# Patient Record
Sex: MALE | Race: WHITE | NOT HISPANIC OR LATINO | Employment: OTHER | URBAN - METROPOLITAN AREA
[De-identification: names, ages, dates, MRNs, and addresses within clinical notes are randomized per-mention and may not be internally consistent; named-entity substitution may affect disease eponyms.]

---

## 2022-01-01 ENCOUNTER — TELEPHONE (OUTPATIENT)
Dept: ELECTROPHYSIOLOGY | Facility: CLINIC | Age: 87
End: 2022-01-01
Payer: MEDICARE

## 2022-01-01 ENCOUNTER — CLINICAL SUPPORT (OUTPATIENT)
Dept: CARDIOLOGY | Facility: HOSPITAL | Age: 87
End: 2022-01-01
Attending: STUDENT IN AN ORGANIZED HEALTH CARE EDUCATION/TRAINING PROGRAM
Payer: MEDICARE

## 2022-01-01 ENCOUNTER — CLINICAL SUPPORT (OUTPATIENT)
Dept: CARDIOLOGY | Facility: HOSPITAL | Age: 87
End: 2022-01-01
Payer: MEDICARE

## 2022-01-01 ENCOUNTER — PATIENT MESSAGE (OUTPATIENT)
Dept: ELECTROPHYSIOLOGY | Facility: CLINIC | Age: 87
End: 2022-01-01
Payer: MEDICARE

## 2022-01-01 ENCOUNTER — OFFICE VISIT (OUTPATIENT)
Dept: ELECTROPHYSIOLOGY | Facility: CLINIC | Age: 87
End: 2022-01-01
Attending: STUDENT IN AN ORGANIZED HEALTH CARE EDUCATION/TRAINING PROGRAM
Payer: MEDICARE

## 2022-01-01 ENCOUNTER — PATIENT MESSAGE (OUTPATIENT)
Dept: CARDIOLOGY | Facility: CLINIC | Age: 87
End: 2022-01-01
Payer: MEDICARE

## 2022-01-01 VITALS
SYSTOLIC BLOOD PRESSURE: 132 MMHG | BODY MASS INDEX: 23.9 KG/M2 | HEART RATE: 73 BPM | WEIGHT: 140 LBS | DIASTOLIC BLOOD PRESSURE: 64 MMHG | HEIGHT: 64 IN

## 2022-01-01 DIAGNOSIS — I25.10 ARTERIOSCLEROSIS OF CORONARY ARTERY: ICD-10-CM

## 2022-01-01 DIAGNOSIS — I71.40 ABDOMINAL AORTIC ANEURYSM (AAA) WITHOUT RUPTURE, UNSPECIFIED PART: ICD-10-CM

## 2022-01-01 DIAGNOSIS — S72.001D HIP FRACTURE REQUIRING OPERATIVE REPAIR, RIGHT, CLOSED, WITH ROUTINE HEALING, SUBSEQUENT ENCOUNTER: ICD-10-CM

## 2022-01-01 DIAGNOSIS — Z95.0 PRESENCE OF CARDIAC PACEMAKER: ICD-10-CM

## 2022-01-01 DIAGNOSIS — Z95.5 STATUS POST CORONARY ARTERY STENT PLACEMENT: ICD-10-CM

## 2022-01-01 DIAGNOSIS — R00.1 SYMPTOMATIC BRADYCARDIA: ICD-10-CM

## 2022-01-01 DIAGNOSIS — I25.10 CORONARY ARTERY DISEASE INVOLVING NATIVE CORONARY ARTERY OF NATIVE HEART WITHOUT ANGINA PECTORIS: ICD-10-CM

## 2022-01-01 DIAGNOSIS — E78.2 MIXED HYPERLIPIDEMIA: ICD-10-CM

## 2022-01-01 DIAGNOSIS — C61 CARCINOMA OF PROSTATE: ICD-10-CM

## 2022-01-01 DIAGNOSIS — Z95.0 PACEMAKER: ICD-10-CM

## 2022-01-01 DIAGNOSIS — I44.2 COMPLETE HEART BLOCK: Primary | ICD-10-CM

## 2022-01-01 DIAGNOSIS — I50.32 CHRONIC DIASTOLIC CONGESTIVE HEART FAILURE: ICD-10-CM

## 2022-01-01 DIAGNOSIS — I45.9 HEART BLOCK: ICD-10-CM

## 2022-01-01 DIAGNOSIS — I44.2 COMPLETE HEART BLOCK: ICD-10-CM

## 2022-01-01 DIAGNOSIS — J44.9 CHRONIC OBSTRUCTIVE PULMONARY DISEASE, UNSPECIFIED COPD TYPE: ICD-10-CM

## 2022-01-01 DIAGNOSIS — M40.209 KYPHOSIS, UNSPECIFIED KYPHOSIS TYPE, UNSPECIFIED SPINAL REGION: ICD-10-CM

## 2022-01-01 DIAGNOSIS — Z95.0 STATUS POST PLACEMENT OF LEADLESS CARDIAC PACEMAKER: ICD-10-CM

## 2022-01-01 DIAGNOSIS — K21.9 GASTROESOPHAGEAL REFLUX DISEASE, UNSPECIFIED WHETHER ESOPHAGITIS PRESENT: ICD-10-CM

## 2022-01-01 PROCEDURE — 3288F PR FALLS RISK ASSESSMENT DOCUMENTED: ICD-10-PCS | Mod: CPTII,S$GLB,, | Performed by: STUDENT IN AN ORGANIZED HEALTH CARE EDUCATION/TRAINING PROGRAM

## 2022-01-01 PROCEDURE — 1100F PR PT FALLS ASSESS DOC 2+ FALLS/FALL W/INJURY/YR: ICD-10-PCS | Mod: CPTII,S$GLB,, | Performed by: STUDENT IN AN ORGANIZED HEALTH CARE EDUCATION/TRAINING PROGRAM

## 2022-01-01 PROCEDURE — 99999 PR PBB SHADOW E&M-EST. PATIENT-LVL III: CPT | Mod: PBBFAC,,, | Performed by: STUDENT IN AN ORGANIZED HEALTH CARE EDUCATION/TRAINING PROGRAM

## 2022-01-01 PROCEDURE — 1100F PTFALLS ASSESS-DOCD GE2>/YR: CPT | Mod: CPTII,S$GLB,, | Performed by: STUDENT IN AN ORGANIZED HEALTH CARE EDUCATION/TRAINING PROGRAM

## 2022-01-01 PROCEDURE — 93296 REM INTERROG EVL PM/IDS: CPT | Performed by: STUDENT IN AN ORGANIZED HEALTH CARE EDUCATION/TRAINING PROGRAM

## 2022-01-01 PROCEDURE — 99214 OFFICE O/P EST MOD 30 MIN: CPT | Mod: S$GLB,,, | Performed by: STUDENT IN AN ORGANIZED HEALTH CARE EDUCATION/TRAINING PROGRAM

## 2022-01-01 PROCEDURE — 93279 PRGRMG DEV EVAL PM/LDLS PM: CPT

## 2022-01-01 PROCEDURE — 1126F AMNT PAIN NOTED NONE PRSNT: CPT | Mod: CPTII,S$GLB,, | Performed by: STUDENT IN AN ORGANIZED HEALTH CARE EDUCATION/TRAINING PROGRAM

## 2022-01-01 PROCEDURE — 1159F MED LIST DOCD IN RCRD: CPT | Mod: CPTII,S$GLB,, | Performed by: STUDENT IN AN ORGANIZED HEALTH CARE EDUCATION/TRAINING PROGRAM

## 2022-01-01 PROCEDURE — 1159F PR MEDICATION LIST DOCUMENTED IN MEDICAL RECORD: ICD-10-PCS | Mod: CPTII,S$GLB,, | Performed by: STUDENT IN AN ORGANIZED HEALTH CARE EDUCATION/TRAINING PROGRAM

## 2022-01-01 PROCEDURE — 1126F PR PAIN SEVERITY QUANTIFIED, NO PAIN PRESENT: ICD-10-PCS | Mod: CPTII,S$GLB,, | Performed by: STUDENT IN AN ORGANIZED HEALTH CARE EDUCATION/TRAINING PROGRAM

## 2022-01-01 PROCEDURE — 99214 PR OFFICE/OUTPT VISIT, EST, LEVL IV, 30-39 MIN: ICD-10-PCS | Mod: S$GLB,,, | Performed by: STUDENT IN AN ORGANIZED HEALTH CARE EDUCATION/TRAINING PROGRAM

## 2022-01-01 PROCEDURE — 3288F FALL RISK ASSESSMENT DOCD: CPT | Mod: CPTII,S$GLB,, | Performed by: STUDENT IN AN ORGANIZED HEALTH CARE EDUCATION/TRAINING PROGRAM

## 2022-01-01 PROCEDURE — 99999 PR PBB SHADOW E&M-EST. PATIENT-LVL III: ICD-10-PCS | Mod: PBBFAC,,, | Performed by: STUDENT IN AN ORGANIZED HEALTH CARE EDUCATION/TRAINING PROGRAM

## 2022-02-22 ENCOUNTER — PATIENT MESSAGE (OUTPATIENT)
Dept: CARDIOLOGY | Facility: CLINIC | Age: 87
End: 2022-02-22
Payer: MEDICARE

## 2022-02-28 ENCOUNTER — HOSPITAL ENCOUNTER (OUTPATIENT)
Dept: CARDIOLOGY | Facility: HOSPITAL | Age: 87
Discharge: HOME | End: 2022-02-28
Attending: INTERNAL MEDICINE
Payer: MEDICARE

## 2022-02-28 ENCOUNTER — OFFICE VISIT (OUTPATIENT)
Dept: CARDIOLOGY | Facility: CLINIC | Age: 87
End: 2022-02-28
Payer: MEDICARE

## 2022-02-28 VITALS
HEIGHT: 65 IN | DIASTOLIC BLOOD PRESSURE: 60 MMHG | SYSTOLIC BLOOD PRESSURE: 140 MMHG | HEART RATE: 40 BPM | BODY MASS INDEX: 22.99 KG/M2 | WEIGHT: 138 LBS

## 2022-02-28 VITALS
HEIGHT: 65 IN | HEART RATE: 40 BPM | SYSTOLIC BLOOD PRESSURE: 141 MMHG | BODY MASS INDEX: 23.03 KG/M2 | WEIGHT: 138.25 LBS | DIASTOLIC BLOOD PRESSURE: 57 MMHG

## 2022-02-28 DIAGNOSIS — I44.2 COMPLETE HEART BLOCK: Primary | ICD-10-CM

## 2022-02-28 DIAGNOSIS — E78.5 HYPERLIPIDEMIA, UNSPECIFIED HYPERLIPIDEMIA TYPE: ICD-10-CM

## 2022-02-28 DIAGNOSIS — I50.31 ACUTE DIASTOLIC CONGESTIVE HEART FAILURE: ICD-10-CM

## 2022-02-28 DIAGNOSIS — I25.10 ARTERIOSCLEROSIS OF CORONARY ARTERY: ICD-10-CM

## 2022-02-28 DIAGNOSIS — J44.9 CHRONIC OBSTRUCTIVE PULMONARY DISEASE, UNSPECIFIED COPD TYPE: ICD-10-CM

## 2022-02-28 PROBLEM — H35.30 MACULAR DEGENERATION: Status: ACTIVE | Noted: 2022-02-28

## 2022-02-28 PROBLEM — K21.9 GASTROESOPHAGEAL REFLUX DISEASE: Status: ACTIVE | Noted: 2022-02-28

## 2022-02-28 PROBLEM — M40.209 KYPHOSIS: Status: ACTIVE | Noted: 2022-02-16

## 2022-02-28 PROBLEM — R42 VERTIGO: Status: ACTIVE | Noted: 2022-02-16

## 2022-02-28 PROBLEM — C61 CARCINOMA OF PROSTATE: Status: ACTIVE | Noted: 2022-02-28

## 2022-02-28 LAB
ASCENDING AORTA: 4.67 CM
AV INDEX (PROSTH): 0.44
AV MEAN GRADIENT: 9 MMHG
AV PEAK GRADIENT: 17 MMHG
AV VALVE AREA: 2 CM2
AV VELOCITY RATIO: 0.39
BSA FOR ECHO PROCEDURE: 1.69 M2
CV ECHO LV RWT: 0.33 CM
DOP CALC AO PEAK VEL: 2.07 M/S
DOP CALC AO VTI: 47.56 CM
DOP CALC LVOT AREA: 4.6 CM2
DOP CALC LVOT DIAMETER: 2.42 CM
DOP CALC LVOT PEAK VEL: 0.8 M/S
DOP CALC LVOT STROKE VOLUME: 95.12 CM3
DOP CALCLVOT PEAK VEL VTI: 20.69 CM
E WAVE DECELERATION TIME: 231.81 MSEC
E/A RATIO: 0.51
E/E' RATIO: 11 M/S
ECHO LV POSTERIOR WALL: 0.82 CM (ref 0.6–1.1)
EJECTION FRACTION: 50 %
FRACTIONAL SHORTENING: 34 % (ref 28–44)
INTERVENTRICULAR SEPTUM: 0.85 CM (ref 0.6–1.1)
IVRT: 282.59 MSEC
LA MAJOR: 5.55 CM
LA MINOR: 5.07 CM
LA WIDTH: 4.77 CM
LEFT ATRIUM SIZE: 3.84 CM
LEFT ATRIUM VOLUME INDEX MOD: 46.4 ML/M2
LEFT ATRIUM VOLUME INDEX: 48.8 ML/M2
LEFT ATRIUM VOLUME MOD: 78.45 CM3
LEFT ATRIUM VOLUME: 82.5 CM3
LEFT INTERNAL DIMENSION IN SYSTOLE: 3.25 CM (ref 2.1–4)
LEFT VENTRICLE DIASTOLIC VOLUME INDEX: 67.48 ML/M2
LEFT VENTRICLE DIASTOLIC VOLUME: 114.04 ML
LEFT VENTRICLE MASS INDEX: 83 G/M2
LEFT VENTRICLE SYSTOLIC VOLUME INDEX: 25.1 ML/M2
LEFT VENTRICLE SYSTOLIC VOLUME: 42.4 ML
LEFT VENTRICULAR INTERNAL DIMENSION IN DIASTOLE: 4.92 CM (ref 3.5–6)
LEFT VENTRICULAR MASS: 139.62 G
LV LATERAL E/E' RATIO: 9.17 M/S
LV SEPTAL E/E' RATIO: 13.75 M/S
MV PEAK A VEL: 1.08 M/S
MV PEAK E VEL: 0.55 M/S
MV STENOSIS PRESSURE HALF TIME: 67.23 MS
MV VALVE AREA P 1/2 METHOD: 3.27 CM2
PISA TR MAX VEL: 4.25 M/S
PULM VEIN S/D RATIO: 1.31
PV PEAK D VEL: 0.35 M/S
PV PEAK S VEL: 0.46 M/S
RA MAJOR: 6.11 CM
RA PRESSURE: 3 MMHG
RA WIDTH: 3.23 CM
RIGHT VENTRICULAR END-DIASTOLIC DIMENSION: 3.32 CM
SINUS: 3.99 CM
STJ: 3.61 CM
TDI LATERAL: 0.06 M/S
TDI SEPTAL: 0.04 M/S
TDI: 0.05 M/S
TR MAX PG: 72 MMHG
TRICUSPID ANNULAR PLANE SYSTOLIC EXCURSION: 2.16 CM
TV REST PULMONARY ARTERY PRESSURE: 75 MMHG

## 2022-02-28 PROCEDURE — 99214 OFFICE O/P EST MOD 30 MIN: CPT | Mod: 25,S$GLB,, | Performed by: INTERNAL MEDICINE

## 2022-02-28 PROCEDURE — 93306 TTE W/DOPPLER COMPLETE: CPT

## 2022-02-28 PROCEDURE — 1101F PT FALLS ASSESS-DOCD LE1/YR: CPT | Mod: CPTII,S$GLB,, | Performed by: INTERNAL MEDICINE

## 2022-02-28 PROCEDURE — 1126F PR PAIN SEVERITY QUANTIFIED, NO PAIN PRESENT: ICD-10-PCS | Mod: CPTII,S$GLB,, | Performed by: INTERNAL MEDICINE

## 2022-02-28 PROCEDURE — 93000 EKG 12-LEAD: ICD-10-PCS | Mod: S$GLB,,, | Performed by: INTERNAL MEDICINE

## 2022-02-28 PROCEDURE — 93000 ELECTROCARDIOGRAM COMPLETE: CPT | Mod: S$GLB,,, | Performed by: INTERNAL MEDICINE

## 2022-02-28 PROCEDURE — 93306 ECHO (CUPID ONLY): ICD-10-PCS | Mod: 26,,, | Performed by: INTERNAL MEDICINE

## 2022-02-28 PROCEDURE — 99999 PR PBB SHADOW E&M-EST. PATIENT-LVL IV: CPT | Mod: PBBFAC,,, | Performed by: INTERNAL MEDICINE

## 2022-02-28 PROCEDURE — 1126F AMNT PAIN NOTED NONE PRSNT: CPT | Mod: CPTII,S$GLB,, | Performed by: INTERNAL MEDICINE

## 2022-02-28 PROCEDURE — 1159F PR MEDICATION LIST DOCUMENTED IN MEDICAL RECORD: ICD-10-PCS | Mod: CPTII,S$GLB,, | Performed by: INTERNAL MEDICINE

## 2022-02-28 PROCEDURE — 1160F PR REVIEW ALL MEDS BY PRESCRIBER/CLIN PHARMACIST DOCUMENTED: ICD-10-PCS | Mod: CPTII,S$GLB,, | Performed by: INTERNAL MEDICINE

## 2022-02-28 PROCEDURE — 3288F FALL RISK ASSESSMENT DOCD: CPT | Mod: CPTII,S$GLB,, | Performed by: INTERNAL MEDICINE

## 2022-02-28 PROCEDURE — 1101F PR PT FALLS ASSESS DOC 0-1 FALLS W/OUT INJ PAST YR: ICD-10-PCS | Mod: CPTII,S$GLB,, | Performed by: INTERNAL MEDICINE

## 2022-02-28 PROCEDURE — 99999 PR PBB SHADOW E&M-EST. PATIENT-LVL IV: ICD-10-PCS | Mod: PBBFAC,,, | Performed by: INTERNAL MEDICINE

## 2022-02-28 PROCEDURE — 99214 PR OFFICE/OUTPT VISIT, EST, LEVL IV, 30-39 MIN: ICD-10-PCS | Mod: 25,S$GLB,, | Performed by: INTERNAL MEDICINE

## 2022-02-28 PROCEDURE — 3288F PR FALLS RISK ASSESSMENT DOCUMENTED: ICD-10-PCS | Mod: CPTII,S$GLB,, | Performed by: INTERNAL MEDICINE

## 2022-02-28 PROCEDURE — 93306 TTE W/DOPPLER COMPLETE: CPT | Mod: 26,,, | Performed by: INTERNAL MEDICINE

## 2022-02-28 PROCEDURE — 1159F MED LIST DOCD IN RCRD: CPT | Mod: CPTII,S$GLB,, | Performed by: INTERNAL MEDICINE

## 2022-02-28 PROCEDURE — 1160F RVW MEDS BY RX/DR IN RCRD: CPT | Mod: CPTII,S$GLB,, | Performed by: INTERNAL MEDICINE

## 2022-02-28 RX ORDER — FINASTERIDE 5 MG/1
5 TABLET, FILM COATED ORAL DAILY
COMMUNITY
Start: 2020-01-01

## 2022-02-28 RX ORDER — FERROUS GLUCONATE 324(38)MG
324 TABLET ORAL
COMMUNITY

## 2022-02-28 RX ORDER — LEVOCETIRIZINE DIHYDROCHLORIDE 5 MG/1
TABLET, FILM COATED ORAL DAILY
COMMUNITY
End: 2022-01-01

## 2022-02-28 RX ORDER — DORZOLAMIDE HCL 20 MG/ML
1 SOLUTION/ DROPS OPHTHALMIC DAILY
COMMUNITY
Start: 2021-12-21

## 2022-02-28 RX ORDER — BIMATOPROST 0.1 MG/ML
1 SOLUTION/ DROPS OPHTHALMIC NIGHTLY
COMMUNITY
Start: 2021-12-21

## 2022-02-28 RX ORDER — BRIMONIDINE TARTRATE 2 MG/ML
1 SOLUTION/ DROPS OPHTHALMIC 2 TIMES DAILY
COMMUNITY
End: 2023-01-01

## 2022-02-28 RX ORDER — TAMSULOSIN HYDROCHLORIDE 0.4 MG/1
0.4 CAPSULE ORAL DAILY
COMMUNITY
Start: 2020-01-01

## 2022-02-28 RX ORDER — FUROSEMIDE 40 MG/1
40 TABLET ORAL
COMMUNITY
Start: 2022-02-22 | End: 2022-06-01 | Stop reason: SDUPTHER

## 2022-02-28 RX ORDER — ACETAMINOPHEN, DIPHENHYDRAMINE HCL, PHENYLEPHRINE HCL 325; 25; 5 MG/1; MG/1; MG/1
1200 TABLET ORAL DAILY
COMMUNITY

## 2022-02-28 RX ORDER — MULTIVITAMIN
TABLET ORAL DAILY
COMMUNITY

## 2022-02-28 RX ORDER — ATORVASTATIN CALCIUM 80 MG/1
TABLET, FILM COATED ORAL
COMMUNITY
Start: 2020-01-01 | End: 2022-03-03 | Stop reason: SDUPTHER

## 2022-03-02 DIAGNOSIS — I45.9 HEART BLOCK: Primary | ICD-10-CM

## 2022-03-03 ENCOUNTER — OFFICE VISIT (OUTPATIENT)
Dept: ELECTROPHYSIOLOGY | Facility: CLINIC | Age: 87
End: 2022-03-03
Payer: MEDICARE

## 2022-03-03 ENCOUNTER — PATIENT MESSAGE (OUTPATIENT)
Dept: CARDIOLOGY | Facility: CLINIC | Age: 87
End: 2022-03-03
Payer: MEDICARE

## 2022-03-03 ENCOUNTER — HOSPITAL ENCOUNTER (OUTPATIENT)
Dept: CARDIOLOGY | Facility: CLINIC | Age: 87
Discharge: HOME | End: 2022-03-03
Payer: MEDICARE

## 2022-03-03 VITALS
SYSTOLIC BLOOD PRESSURE: 118 MMHG | BODY MASS INDEX: 22.99 KG/M2 | WEIGHT: 138 LBS | DIASTOLIC BLOOD PRESSURE: 68 MMHG | HEIGHT: 65 IN | HEART RATE: 63 BPM

## 2022-03-03 DIAGNOSIS — I45.9 HEART BLOCK: ICD-10-CM

## 2022-03-03 DIAGNOSIS — I25.10 CORONARY ARTERY DISEASE INVOLVING NATIVE CORONARY ARTERY OF NATIVE HEART WITHOUT ANGINA PECTORIS: ICD-10-CM

## 2022-03-03 DIAGNOSIS — R00.1 SYMPTOMATIC BRADYCARDIA: ICD-10-CM

## 2022-03-03 DIAGNOSIS — M40.209 KYPHOSIS, UNSPECIFIED KYPHOSIS TYPE, UNSPECIFIED SPINAL REGION: ICD-10-CM

## 2022-03-03 DIAGNOSIS — J44.9 CHRONIC OBSTRUCTIVE PULMONARY DISEASE, UNSPECIFIED COPD TYPE: ICD-10-CM

## 2022-03-03 DIAGNOSIS — I44.2 COMPLETE HEART BLOCK: Primary | ICD-10-CM

## 2022-03-03 PROCEDURE — 93010 RHYTHM STRIP: ICD-10-PCS | Mod: S$GLB,,, | Performed by: INTERNAL MEDICINE

## 2022-03-03 PROCEDURE — 99215 OFFICE O/P EST HI 40 MIN: CPT | Mod: S$GLB,,, | Performed by: STUDENT IN AN ORGANIZED HEALTH CARE EDUCATION/TRAINING PROGRAM

## 2022-03-03 PROCEDURE — 93005 RHYTHM STRIP: ICD-10-PCS | Mod: S$GLB,,, | Performed by: STUDENT IN AN ORGANIZED HEALTH CARE EDUCATION/TRAINING PROGRAM

## 2022-03-03 PROCEDURE — 1159F MED LIST DOCD IN RCRD: CPT | Mod: CPTII,S$GLB,, | Performed by: STUDENT IN AN ORGANIZED HEALTH CARE EDUCATION/TRAINING PROGRAM

## 2022-03-03 PROCEDURE — 99215 PR OFFICE/OUTPT VISIT, EST, LEVL V, 40-54 MIN: ICD-10-PCS | Mod: S$GLB,,, | Performed by: STUDENT IN AN ORGANIZED HEALTH CARE EDUCATION/TRAINING PROGRAM

## 2022-03-03 PROCEDURE — 99999 PR PBB SHADOW E&M-EST. PATIENT-LVL IV: ICD-10-PCS | Mod: PBBFAC,,, | Performed by: STUDENT IN AN ORGANIZED HEALTH CARE EDUCATION/TRAINING PROGRAM

## 2022-03-03 PROCEDURE — 93010 ELECTROCARDIOGRAM REPORT: CPT | Mod: S$GLB,,, | Performed by: INTERNAL MEDICINE

## 2022-03-03 PROCEDURE — 3288F PR FALLS RISK ASSESSMENT DOCUMENTED: ICD-10-PCS | Mod: CPTII,S$GLB,, | Performed by: STUDENT IN AN ORGANIZED HEALTH CARE EDUCATION/TRAINING PROGRAM

## 2022-03-03 PROCEDURE — 1101F PT FALLS ASSESS-DOCD LE1/YR: CPT | Mod: CPTII,S$GLB,, | Performed by: STUDENT IN AN ORGANIZED HEALTH CARE EDUCATION/TRAINING PROGRAM

## 2022-03-03 PROCEDURE — 1159F PR MEDICATION LIST DOCUMENTED IN MEDICAL RECORD: ICD-10-PCS | Mod: CPTII,S$GLB,, | Performed by: STUDENT IN AN ORGANIZED HEALTH CARE EDUCATION/TRAINING PROGRAM

## 2022-03-03 PROCEDURE — 3288F FALL RISK ASSESSMENT DOCD: CPT | Mod: CPTII,S$GLB,, | Performed by: STUDENT IN AN ORGANIZED HEALTH CARE EDUCATION/TRAINING PROGRAM

## 2022-03-03 PROCEDURE — 93005 ELECTROCARDIOGRAM TRACING: CPT | Mod: S$GLB,,, | Performed by: STUDENT IN AN ORGANIZED HEALTH CARE EDUCATION/TRAINING PROGRAM

## 2022-03-03 PROCEDURE — 1126F PR PAIN SEVERITY QUANTIFIED, NO PAIN PRESENT: ICD-10-PCS | Mod: CPTII,S$GLB,, | Performed by: STUDENT IN AN ORGANIZED HEALTH CARE EDUCATION/TRAINING PROGRAM

## 2022-03-03 PROCEDURE — 1126F AMNT PAIN NOTED NONE PRSNT: CPT | Mod: CPTII,S$GLB,, | Performed by: STUDENT IN AN ORGANIZED HEALTH CARE EDUCATION/TRAINING PROGRAM

## 2022-03-03 PROCEDURE — 99999 PR PBB SHADOW E&M-EST. PATIENT-LVL IV: CPT | Mod: PBBFAC,,, | Performed by: STUDENT IN AN ORGANIZED HEALTH CARE EDUCATION/TRAINING PROGRAM

## 2022-03-03 PROCEDURE — 1101F PR PT FALLS ASSESS DOC 0-1 FALLS W/OUT INJ PAST YR: ICD-10-PCS | Mod: CPTII,S$GLB,, | Performed by: STUDENT IN AN ORGANIZED HEALTH CARE EDUCATION/TRAINING PROGRAM

## 2022-03-03 RX ORDER — ATORVASTATIN CALCIUM 80 MG/1
80 TABLET, FILM COATED ORAL NIGHTLY
Qty: 90 TABLET | Refills: 3 | Status: SHIPPED | OUTPATIENT
Start: 2022-03-03 | End: 2023-01-01

## 2022-03-04 NOTE — PROGRESS NOTES
Electrophysiology Clinic Note    Reason for new patient visit: Evaluation and recommendations regarding symptomatic bradycardia with periods of complete heart block.     PRESENTING HISTORY:     History of Present Illness:  Mr. Jesus Matos Sr. is a rodo 89-year-old gentleman who presents today for evaluation and recommendations regarding symptomatic bradycardia with periods of complete heart block. He has a past medical history significant for symptomatic bradycardia with periods of complete heart block, coronary artery disease, COPD, and kyphosis.    He follows in general cardiology with Dr. Soliz and receives most of his care from [facility]. He was recently seen by Dr. Soliz on 2/28/2022 with complaints of slightly worsened shortness of breath, generalized fatigue, and transient episodes of lightheadedness. He was noted on an ECG at that time to have complete heart block and was referred for pacemaker implantation.     Mr. Matos presents today with his daughter. In discussion with Mr. Matos today, he tells me that he is feeling overall well, but continues to endorse fatigue and periods of lightheadedness. He denies any episodes of syncope/presyncope, chest pain or chest discomfort, palpitations, nausea or vomiting, orthopnea, or PND. He reports that he previously experienced mild lower extremity edema, but remains stable on his current diuretic dose with no further episodes of edema. He reports baseline mild shortness of breath and dyspnea with exertion, and feels that this is slightly worse than his baseline COPD. He ambulates with a walker or in his wheelchair for farther distances. He cannot climb a flight of stairs secondary to osteoarthritis and kyphosis.     Review of Systems:  Review of Systems   Constitutional: Positive for fatigue. Negative for activity change.        Exercise intolerance and generalized fatigue.   HENT: Positive for hearing loss. Negative for nasal  congestion, nosebleeds, postnasal drip, rhinorrhea, sinus pressure/congestion, sneezing and sore throat.    Respiratory: Positive for shortness of breath. Negative for apnea, cough, chest tightness and wheezing.    Cardiovascular: Positive for leg swelling. Negative for chest pain and palpitations.   Gastrointestinal: Negative for abdominal distention, abdominal pain, blood in stool, change in bowel habit, constipation, diarrhea, nausea, vomiting and change in bowel habit.   Genitourinary: Negative for dysuria and hematuria.   Musculoskeletal: Positive for arthralgias, back pain and gait problem.   Neurological: Positive for dizziness and light-headedness. Negative for seizures, syncope, weakness, headaches, disturbances in coordination and coordination difficulties.        PAST HISTORY:     Past Medical History:  - Symptomatic bradycardia   - Periods of complete heart block  - Coronary artery disease  - COPD  - Kyphosis    Family History:  He denies any relevant cardiac history.    Social History:  He  reports that he has never smoked. He has never used smokeless tobacco. He reports previous alcohol use. No history on file for drug use.       MEDICATIONS & ALLERGIES:     Review of patient's allergies indicates:   Allergen Reactions    Opioids - morphine analogues Anaphylaxis     Other reaction(s): Low blood pressure, SHOCK, UNSPECIFIED    Aspirin     Nsaids (non-steroidal anti-inflammatory drug)      Other reaction(s): Other (See Comments)    Tramadol Hallucinations    Penicillins Nausea Only     Other reaction(s): nausa       Current Outpatient Medications on File Prior to Visit   Medication Sig Dispense Refill    bimatoprost (LUMIGAN) 0.01 % Drop       brimonidine 0.2% (ALPHAGAN) 0.2 % Drop 1 drop.      calcitonin,salmon,synthetic (CALCITONIN, SALMON, NASL)       calcium carbonate/vitamin D3 (CALCIUM 500 + D, D3, ORAL) Take by mouth Daily.      clopidogreL (PLAVIX) 75 mg tablet TAKE 1 TABLET EVERY DAY  "90 tablet 3    cyanocobalamin, vitamin B-12, 5,000 mcg Subl Place 1,200 mcg under the tongue Daily.      dorzolamide (TRUSOPT) 2 % ophthalmic solution Place 1 drop into both eyes Daily.      ferrous gluconate (FERGON) 324 MG tablet Take 324 mg by mouth daily with breakfast.      finasteride (PROSCAR) 5 mg tablet Take 5 mg by mouth once daily.      furosemide (LASIX) 40 MG tablet Take 40 mg by mouth.      levocetirizine (XYZAL) 5 MG tablet Take 5 mg by mouth every evening.      multivitamin (THERAGRAN) per tablet Take 1 tablet by mouth once daily.      tamsulosin (FLOMAX) 0.4 mg Cap Take 0.4 mg by mouth once daily.      vit C/E/Zn/coppr/lutein/zeaxan (PRESERVISION AREDS-2 ORAL) Take by mouth Daily.       No current facility-administered medications on file prior to visit.        OBJECTIVE:     Vital Signs:  /68   Pulse 63   Ht 5' 5" (1.651 m)   Wt 62.6 kg (138 lb 0.1 oz)   BMI 22.97 kg/m²     Physical Exam:  Physical Exam  Constitutional:       General: He is not in acute distress.     Appearance: Normal appearance. He is not ill-appearing or diaphoretic.      Comments: Well-appearing elderly man in NAD presenting in a wheelchair.   HENT:      Head: Normocephalic and atraumatic.      Comments: Mask worn in clinic in the setting of COVID precautions.   Eyes:      Pupils: Pupils are equal, round, and reactive to light.   Cardiovascular:      Rate and Rhythm: Normal rate and regular rhythm.      Pulses: Normal pulses.      Heart sounds: Normal heart sounds. No murmur heard.    No friction rub. No gallop.   Pulmonary:      Effort: Pulmonary effort is normal. No respiratory distress.      Breath sounds: Normal breath sounds. No wheezing, rhonchi or rales.   Chest:      Chest wall: No tenderness.   Abdominal:      General: There is no distension.      Palpations: Abdomen is soft.      Tenderness: There is no abdominal tenderness.   Musculoskeletal:         General: No swelling or tenderness.      " Cervical back: Normal range of motion.      Right lower leg: No edema.      Left lower leg: No edema.   Skin:     General: Skin is warm and dry.      Findings: No erythema, lesion or rash.   Neurological:      General: No focal deficit present.      Mental Status: He is alert and oriented to person, place, and time. Mental status is at baseline.      Motor: No weakness.      Gait: Gait normal.   Psychiatric:         Mood and Affect: Mood normal.         Behavior: Behavior normal.        Laboratory Data:  Lab Results   Component Value Date    WBC 6.02 02/09/2010    HGB 12.6 (L) 02/09/2010    HCT 38.0 (L) 02/09/2010    MCV 89.8 02/09/2010     02/09/2010     Lab Results   Component Value Date    GLU 99 02/09/2010     02/09/2010    K 4.6 02/09/2010     02/09/2010    CO2 30 (H) 02/09/2010    BUN 23 02/09/2010    CREATININE 0.9 02/09/2010    CALCIUM 8.7 02/09/2010     Lab Results   Component Value Date    INR 1.0 02/09/2010       Pertinent Cardiac Data:  ECG: Normal sinus rhythm with first degree AV block, rate of 63 bpm,  ms, RBBB and LAFB with  ms, QT/QTc 496/507 ms, isolated PVC.     Resting 2D Transthoracic Echocardiogram - 2/28/2022:  · The left ventricle is normal in size with low normal systolic function.  · The estimated ejection fraction is 50%.  · Grade I left ventricular diastolic dysfunction.  · Moderate left atrial enlargement.  · The estimated PA systolic pressure is 75 mmHg.  · Normal right ventricular size with normal right ventricular systolic function.  · There is moderate pulmonary hypertension.  · Normal central venous pressure (3 mmHg).  · Moderate right atrial enlargement.  · Mild mitral regurgitation.  · Mild tricuspid regurgitation.  · Mild aortic regurgitation.      ASSESSMENT & PLAN:   Mr. Jesus Matos Sr. is a rodo 89-year-old gentleman who presents today for evaluation and recommendations regarding symptomatic bradycardia with periods of complete heart  block. He has a past medical history significant for symptomatic bradycardia with periods of complete heart block, coronary artery disease, COPD, and kyphosis.    - We discussed the process of age-related conduction system fibrosis and the concept of sinus node dysfunction. We reviewed his prior ECG revealing complete heart block and his current ECG revealing trifascicular block. We discussed that a permanent pacemaker is required in order to ensure his heart rates remain within normal ranges and to ensure AV synchrony. He is now endorsing symptoms of dizziness, lightheadedness, exercise intolerance, and periods of presyncope associated with his periods of complete heart block.   - We reviewed his recent resting echocardiogram demonstrating preserved systolic function, with LVEF 50%. He is right-hand dominant, with RBBB QRSd of 148 ms. 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   - His daughter mentions that he may have tortuous subclavian vasculature, and that transvenous placement may be difficult. We will plan to obtain a venogram prior to pocket creation for assessment. If transvenous placement proves prohibitive, we discussed the concept of implantation of a Micra AV leadless pacemaker from the groin.    - The procedure itself was discussed, along with the indications for implantation of a pacemaker, including potential risks, benefits, and alternative options. We reviewed what to expect at the time of implant in addition to the need for continued clinic observation following implantation.   - All questions and concerns were addressed, and Mr. Matos and his daughter agree to proceed with implantation of a pacemaker. Informed consent was obtained in clinic today.     This patient will present to the EP laboratory at my next available procedure date to undergo pacemaker implantation. All questions and concerns were  addressed at this encounter.     Signing Physician:       JORGE L Garcia MD  Electrophysiology Attending

## 2022-03-05 PROBLEM — R00.1 SYMPTOMATIC BRADYCARDIA: Status: ACTIVE | Noted: 2022-03-05

## 2022-03-05 PROBLEM — I44.2 COMPLETE HEART BLOCK: Status: ACTIVE | Noted: 2022-03-05

## 2022-03-11 ENCOUNTER — PATIENT MESSAGE (OUTPATIENT)
Dept: ELECTROPHYSIOLOGY | Facility: CLINIC | Age: 87
End: 2022-03-11
Payer: MEDICARE

## 2022-03-11 DIAGNOSIS — I49.9 CARDIAC ARRHYTHMIA, UNSPECIFIED CARDIAC ARRHYTHMIA TYPE: ICD-10-CM

## 2022-03-11 DIAGNOSIS — I44.2 COMPLETE HEART BLOCK: Primary | ICD-10-CM

## 2022-03-11 DIAGNOSIS — R00.1 SYMPTOMATIC BRADYCARDIA: ICD-10-CM

## 2022-03-14 ENCOUNTER — PATIENT MESSAGE (OUTPATIENT)
Dept: MEDSURG UNIT | Facility: HOSPITAL | Age: 87
End: 2022-03-14
Payer: MEDICARE

## 2022-03-14 LAB
ANION GAP SERPL CALC-SCNC: 15.5 MMOL/L (ref 9–18)
APTT PPP: 27.9 SECONDS (ref 22.7–33.4)
BUN BLD-MCNC: 35 MG/DL (ref 7–21)
BUN/CREAT SERPL: 37 (ref 6–22)
CALC OSMOLALITY: 299 MOSM/KG (ref 275–295)
CALCIUM SERPL-MCNC: 9.4 MG/DL (ref 8.5–10.3)
CHLORIDE SERPL-SCNC: 111 MMOL/L (ref 98–107)
CO2 SERPL-SCNC: 25 MMOL/L (ref 21–31)
CREAT SERPL-MCNC: 0.94 MG/DL (ref 0.7–1.2)
EGFR: 78 ML/MIN
ERYTHROCYTE [DISTWIDTH] IN BLOOD BY AUTOMATED COUNT: 14 % (ref 12–15.3)
GFR: 67 ML/MIN
GLUCOSE SERPL-MCNC: 103 MG/DL (ref 70–100)
HCT VFR BLD AUTO: 39.1 % (ref 40–52)
HGB BLD-MCNC: 12.8 GM/DL (ref 13.6–17.5)
INR PPP: 1.1 (ref 0.8–1.2)
MCH RBC QN AUTO: 30.1 PG (ref 27–33)
MCHC RBC AUTO-ENTMCNC: 32.6 G/DL (ref 32–36)
MCV RBC AUTO: 92.3 FL (ref 80–94)
PLATELET # BLD AUTO: 177 THOUSAND/UL (ref 150–350)
PMV BLD AUTO: 9.2 FL (ref 7–10.2)
POTASSIUM SERPL-SCNC: 5.5 MMOL/L (ref 3.5–5)
PROTHROMBIN TIME: 14.6 SECONDS (ref 12.3–14.7)
RBC # BLD AUTO: 4.24 MILLION/UL (ref 4.45–5.9)
SODIUM BLD-SCNC: 146 MMOL/L (ref 135–145)
WBC # BLD AUTO: 8.9 THOUSAND/UL (ref 4.5–11)

## 2022-03-15 ENCOUNTER — PATIENT MESSAGE (OUTPATIENT)
Dept: MEDSURG UNIT | Facility: HOSPITAL | Age: 87
End: 2022-03-15
Payer: MEDICARE

## 2022-03-15 ENCOUNTER — TELEPHONE (OUTPATIENT)
Dept: ELECTROPHYSIOLOGY | Facility: CLINIC | Age: 87
End: 2022-03-15
Payer: MEDICARE

## 2022-03-15 NOTE — TELEPHONE ENCOUNTER
Spoke to Ms. Arriaza, patient's daughter.    CONFIRMED procedure arrival time of 8:30am    Reiterated instructions including:  -Directions to check in desk  -NPO after midnight night prior to procedure   -Pre-procedure LABS done, reviewed  -COVID test n/a, vacc  -Confirmed no fever, cough, or shortness of breath in the past 30 days  -Bathe night prior and morning prior to procedure with Hibiclens solution or an antibacterial soap    Patient verbalizes understanding of above and appreciates call.

## 2022-03-16 ENCOUNTER — ANESTHESIA EVENT (OUTPATIENT)
Dept: MEDSURG UNIT | Facility: HOSPITAL | Age: 87
End: 2022-03-16
Payer: MEDICARE

## 2022-03-16 ENCOUNTER — HOSPITAL ENCOUNTER (OUTPATIENT)
Facility: HOSPITAL | Age: 87
Discharge: HOME | End: 2022-03-17
Attending: STUDENT IN AN ORGANIZED HEALTH CARE EDUCATION/TRAINING PROGRAM | Admitting: STUDENT IN AN ORGANIZED HEALTH CARE EDUCATION/TRAINING PROGRAM
Payer: MEDICARE

## 2022-03-16 ENCOUNTER — TELEPHONE (OUTPATIENT)
Dept: CARDIOLOGY | Facility: CLINIC | Age: 87
End: 2022-03-16
Payer: MEDICARE

## 2022-03-16 ENCOUNTER — ANESTHESIA (OUTPATIENT)
Dept: MEDSURG UNIT | Facility: HOSPITAL | Age: 87
End: 2022-03-16
Payer: MEDICARE

## 2022-03-16 DIAGNOSIS — Z01.812 PRE-PROCEDURE LAB EXAM: ICD-10-CM

## 2022-03-16 DIAGNOSIS — R00.1 SYMPTOMATIC BRADYCARDIA: ICD-10-CM

## 2022-03-16 DIAGNOSIS — Z95.9 CARDIAC DEVICE IN SITU: ICD-10-CM

## 2022-03-16 DIAGNOSIS — I44.2 COMPLETE HEART BLOCK: ICD-10-CM

## 2022-03-16 LAB
ANION GAP SERPL CALC-SCNC: 8 MMOL/L (ref 8–16)
BUN SERPL-MCNC: 36 MG/DL (ref 10–30)
CALCIUM SERPL-MCNC: 9.2 MG/DL (ref 8.7–10.5)
CHLORIDE SERPL-SCNC: 109 MMOL/L (ref 95–110)
CO2 SERPL-SCNC: 27 MMOL/L (ref 23–29)
CREAT SERPL-MCNC: 1 MG/DL (ref 0.5–1.4)
EST. GFR  (AFRICAN AMERICAN): >60 ML/MIN/1.73 M^2
EST. GFR  (NON AFRICAN AMERICAN): >60 ML/MIN/1.73 M^2
GLUCOSE SERPL-MCNC: 109 MG/DL (ref 70–110)
POTASSIUM SERPL-SCNC: 4.3 MMOL/L (ref 3.5–5.1)
SODIUM SERPL-SCNC: 144 MMOL/L (ref 136–145)

## 2022-03-16 PROCEDURE — D9220A PRA ANESTHESIA: ICD-10-PCS | Mod: CRNA,,, | Performed by: NURSE ANESTHETIST, CERTIFIED REGISTERED

## 2022-03-16 PROCEDURE — 63600175 PHARM REV CODE 636 W HCPCS: Performed by: STUDENT IN AN ORGANIZED HEALTH CARE EDUCATION/TRAINING PROGRAM

## 2022-03-16 PROCEDURE — C1786 PMKR, SINGLE, RATE-RESP: HCPCS | Performed by: STUDENT IN AN ORGANIZED HEALTH CARE EDUCATION/TRAINING PROGRAM

## 2022-03-16 PROCEDURE — 93005 ELECTROCARDIOGRAM TRACING: CPT

## 2022-03-16 PROCEDURE — 33274 PR TRANSCATH INSERT/RPL, PERM LEADLESS PACEMKR, RT VENTR W/IMG: ICD-10-PCS | Mod: Q0,,, | Performed by: STUDENT IN AN ORGANIZED HEALTH CARE EDUCATION/TRAINING PROGRAM

## 2022-03-16 PROCEDURE — 93010 EKG 12-LEAD: ICD-10-PCS | Mod: ,,, | Performed by: INTERNAL MEDICINE

## 2022-03-16 PROCEDURE — 25000003 PHARM REV CODE 250: Performed by: STUDENT IN AN ORGANIZED HEALTH CARE EDUCATION/TRAINING PROGRAM

## 2022-03-16 PROCEDURE — D9220A PRA ANESTHESIA: Mod: CRNA,,, | Performed by: NURSE ANESTHETIST, CERTIFIED REGISTERED

## 2022-03-16 PROCEDURE — 93005 ELECTROCARDIOGRAM TRACING: CPT | Mod: 59

## 2022-03-16 PROCEDURE — 25500020 PHARM REV CODE 255: Performed by: STUDENT IN AN ORGANIZED HEALTH CARE EDUCATION/TRAINING PROGRAM

## 2022-03-16 PROCEDURE — 25000003 PHARM REV CODE 250: Performed by: INTERNAL MEDICINE

## 2022-03-16 PROCEDURE — C1769 GUIDE WIRE: HCPCS | Performed by: STUDENT IN AN ORGANIZED HEALTH CARE EDUCATION/TRAINING PROGRAM

## 2022-03-16 PROCEDURE — C1730 CATH, EP, 19 OR FEW ELECT: HCPCS | Performed by: STUDENT IN AN ORGANIZED HEALTH CARE EDUCATION/TRAINING PROGRAM

## 2022-03-16 PROCEDURE — 63600175 PHARM REV CODE 636 W HCPCS: Performed by: NURSE ANESTHETIST, CERTIFIED REGISTERED

## 2022-03-16 PROCEDURE — 25000003 PHARM REV CODE 250: Performed by: NURSE ANESTHETIST, CERTIFIED REGISTERED

## 2022-03-16 PROCEDURE — 63600175 PHARM REV CODE 636 W HCPCS: Performed by: NURSE PRACTITIONER

## 2022-03-16 PROCEDURE — C1894 INTRO/SHEATH, NON-LASER: HCPCS | Performed by: STUDENT IN AN ORGANIZED HEALTH CARE EDUCATION/TRAINING PROGRAM

## 2022-03-16 PROCEDURE — 93010 ELECTROCARDIOGRAM REPORT: CPT | Mod: ,,, | Performed by: INTERNAL MEDICINE

## 2022-03-16 PROCEDURE — D9220A PRA ANESTHESIA: Mod: ANES,,, | Performed by: ANESTHESIOLOGY

## 2022-03-16 PROCEDURE — 37000008 HC ANESTHESIA 1ST 15 MINUTES: Performed by: STUDENT IN AN ORGANIZED HEALTH CARE EDUCATION/TRAINING PROGRAM

## 2022-03-16 PROCEDURE — 33274 TCAT INSJ/RPL PERM LDLS PM: CPT | Performed by: STUDENT IN AN ORGANIZED HEALTH CARE EDUCATION/TRAINING PROGRAM

## 2022-03-16 PROCEDURE — D9220A PRA ANESTHESIA: ICD-10-PCS | Mod: ANES,,, | Performed by: ANESTHESIOLOGY

## 2022-03-16 PROCEDURE — 37000009 HC ANESTHESIA EA ADD 15 MINS: Performed by: STUDENT IN AN ORGANIZED HEALTH CARE EDUCATION/TRAINING PROGRAM

## 2022-03-16 PROCEDURE — 80048 BASIC METABOLIC PNL TOTAL CA: CPT | Performed by: NURSE PRACTITIONER

## 2022-03-16 PROCEDURE — 33274 TCAT INSJ/RPL PERM LDLS PM: CPT | Mod: Q0,,, | Performed by: STUDENT IN AN ORGANIZED HEALTH CARE EDUCATION/TRAINING PROGRAM

## 2022-03-16 DEVICE — TPS MC1AVR1 MICRA AV US H3
Type: IMPLANTABLE DEVICE | Site: HEART | Status: FUNCTIONAL
Brand: MICRA™ AV

## 2022-03-16 RX ORDER — FINASTERIDE 5 MG/1
5 TABLET, FILM COATED ORAL DAILY
Status: DISCONTINUED | OUTPATIENT
Start: 2022-03-17 | End: 2022-03-17 | Stop reason: HOSPADM

## 2022-03-16 RX ORDER — FERROUS GLUCONATE 324(37.5)
324 TABLET ORAL
Status: DISCONTINUED | OUTPATIENT
Start: 2022-03-17 | End: 2022-03-17 | Stop reason: HOSPADM

## 2022-03-16 RX ORDER — ACETAMINOPHEN 325 MG/1
650 TABLET ORAL EVERY 4 HOURS PRN
Status: DISCONTINUED | OUTPATIENT
Start: 2022-03-16 | End: 2022-03-17 | Stop reason: HOSPADM

## 2022-03-16 RX ORDER — FENTANYL CITRATE 50 UG/ML
25 INJECTION, SOLUTION INTRAMUSCULAR; INTRAVENOUS EVERY 5 MIN PRN
Status: DISCONTINUED | OUTPATIENT
Start: 2022-03-16 | End: 2022-03-16

## 2022-03-16 RX ORDER — PROPOFOL 10 MG/ML
VIAL (ML) INTRAVENOUS CONTINUOUS PRN
Status: DISCONTINUED | OUTPATIENT
Start: 2022-03-16 | End: 2022-03-16

## 2022-03-16 RX ORDER — IODIXANOL 320 MG/ML
INJECTION, SOLUTION INTRAVASCULAR
Status: DISCONTINUED | OUTPATIENT
Start: 2022-03-16 | End: 2022-03-17

## 2022-03-16 RX ORDER — HEPARIN SOD,PORCINE/0.9 % NACL 1000/500ML
INTRAVENOUS SOLUTION INTRAVENOUS
Status: DISCONTINUED | OUTPATIENT
Start: 2022-03-16 | End: 2022-03-17

## 2022-03-16 RX ORDER — DOXYCYCLINE 100 MG/1
100 CAPSULE ORAL EVERY 12 HOURS
Qty: 10 CAPSULE | Refills: 0 | Status: SHIPPED | OUTPATIENT
Start: 2022-03-16 | End: 2022-03-21

## 2022-03-16 RX ORDER — ACETAMINOPHEN 500 MG
500 TABLET ORAL EVERY 6 HOURS PRN
COMMUNITY

## 2022-03-16 RX ORDER — HEPARIN SODIUM 1000 [USP'U]/ML
INJECTION, SOLUTION INTRAVENOUS; SUBCUTANEOUS
Status: DISCONTINUED | OUTPATIENT
Start: 2022-03-16 | End: 2022-03-16

## 2022-03-16 RX ORDER — PROTAMINE SULFATE 10 MG/ML
INJECTION, SOLUTION INTRAVENOUS
Status: DISCONTINUED | OUTPATIENT
Start: 2022-03-16 | End: 2022-03-16

## 2022-03-16 RX ORDER — ATORVASTATIN CALCIUM 20 MG/1
80 TABLET, FILM COATED ORAL NIGHTLY
Status: DISCONTINUED | OUTPATIENT
Start: 2022-03-16 | End: 2022-03-17 | Stop reason: HOSPADM

## 2022-03-16 RX ORDER — ONDANSETRON 2 MG/ML
4 INJECTION INTRAMUSCULAR; INTRAVENOUS DAILY PRN
Status: DISCONTINUED | OUTPATIENT
Start: 2022-03-16 | End: 2022-03-17 | Stop reason: HOSPADM

## 2022-03-16 RX ORDER — LIDOCAINE HYDROCHLORIDE 20 MG/ML
INJECTION, SOLUTION INFILTRATION; PERINEURAL
Status: DISCONTINUED | OUTPATIENT
Start: 2022-03-16 | End: 2022-03-17

## 2022-03-16 RX ORDER — TIZANIDINE 2 MG/1
1 TABLET ORAL EVERY 6 HOURS PRN
COMMUNITY
End: 2022-04-21

## 2022-03-16 RX ORDER — DORZOLAMIDE HCL 20 MG/ML
1 SOLUTION/ DROPS OPHTHALMIC DAILY
Status: DISCONTINUED | OUTPATIENT
Start: 2022-03-16 | End: 2022-03-17 | Stop reason: HOSPADM

## 2022-03-16 RX ORDER — SODIUM CHLORIDE 0.9 % (FLUSH) 0.9 %
10 SYRINGE (ML) INJECTION
Status: DISCONTINUED | OUTPATIENT
Start: 2022-03-16 | End: 2022-03-17 | Stop reason: HOSPADM

## 2022-03-16 RX ORDER — CLOPIDOGREL BISULFATE 75 MG/1
75 TABLET ORAL DAILY
Status: DISCONTINUED | OUTPATIENT
Start: 2022-03-17 | End: 2022-03-17 | Stop reason: HOSPADM

## 2022-03-16 RX ORDER — FENTANYL CITRATE 50 UG/ML
INJECTION, SOLUTION INTRAMUSCULAR; INTRAVENOUS
Status: DISCONTINUED | OUTPATIENT
Start: 2022-03-16 | End: 2022-03-16

## 2022-03-16 RX ORDER — VANCOMYCIN HCL IN 5 % DEXTROSE 1G/250ML
1000 PLASTIC BAG, INJECTION (ML) INTRAVENOUS
Status: DISCONTINUED | OUTPATIENT
Start: 2022-03-16 | End: 2022-03-16

## 2022-03-16 RX ADMIN — DORZOLAMIDE HYDROCHLORIDE 1 DROP: 20 SOLUTION/ DROPS OPHTHALMIC at 07:03

## 2022-03-16 RX ADMIN — HEPARIN SODIUM 3000 UNITS: 1000 INJECTION INTRAVENOUS; SUBCUTANEOUS at 01:03

## 2022-03-16 RX ADMIN — VANCOMYCIN HYDROCHLORIDE 1000 MG: 1 INJECTION, POWDER, LYOPHILIZED, FOR SOLUTION INTRAVENOUS at 11:03

## 2022-03-16 RX ADMIN — BIMATOPROST 1 DROP: 0.1 SOLUTION/ DROPS OPHTHALMIC at 09:03

## 2022-03-16 RX ADMIN — ATORVASTATIN CALCIUM 80 MG: 20 TABLET, FILM COATED ORAL at 09:03

## 2022-03-16 RX ADMIN — PROPOFOL 25 MCG/KG/MIN: 10 INJECTION, EMULSION INTRAVENOUS at 12:03

## 2022-03-16 RX ADMIN — FENTANYL CITRATE 12.5 MCG: 0.05 INJECTION, SOLUTION INTRAMUSCULAR; INTRAVENOUS at 02:03

## 2022-03-16 RX ADMIN — SODIUM CHLORIDE: 9 INJECTION, SOLUTION INTRAVENOUS at 11:03

## 2022-03-16 RX ADMIN — HEPARIN SODIUM 2000 UNITS: 1000 INJECTION INTRAVENOUS; SUBCUTANEOUS at 02:03

## 2022-03-16 RX ADMIN — FENTANYL CITRATE 12.5 MCG: 0.05 INJECTION, SOLUTION INTRAMUSCULAR; INTRAVENOUS at 03:03

## 2022-03-16 RX ADMIN — PROTAMINE SULFATE 15 MG: 10 INJECTION, SOLUTION INTRAVENOUS at 02:03

## 2022-03-16 NOTE — H&P
[facility] - Short Stay Cardiac Unit  Cardiac Electrophysiology  History and Physical     Admission Date: 3/16/2022  Code Status: No Order   Attending Provider: HUMERA Garcia MD   Principal Problem:<principal problem not specified>    Subjective:     Chief Complaint:  CHB     HPI:  Jesus Matos Sr. presents to short stay cardiac unit on 03/16/2022 for planned dual chamber PPM vs Micra with Dr. Garcia.     Mr. Matos is a 89 y.o. 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 He has a past medical history significant for symptomatic bradycardia with periods of complete heart block, coronary artery disease, COPD, and kyphosis.     He follows in general cardiology with Dr. Soliz and receives most of his care from [facility]. He was recently seen by Dr. Soliz on 2/28/2022 with complaints of slightly worsened shortness of breath, generalized fatigue, and transient episodes of lightheadedness. He was noted on an ECG at that time to have complete heart block and was referred for pacemaker implantation.      Mr. Matos was then seen by Dr. Garcia for further evaluation. In discussion, he reported fatigue and periods of lightheadedness. He denied any episodes of syncope/presyncope, chest pain or chest discomfort, palpitations, nausea or vomiting, orthopnea, or PND. He reports that he previously experienced mild lower extremity edema, but remains stable on his current diuretic dose with no further episodes of edema. He reports baseline mild shortness of breath and dyspnea with exertion, and feels that this is slightly worse than his baseline COPD.   Dr. Garcia discussed the process of age-related conduction system fibrosis and the concept of sinus node dysfunction. Given evidence of CHB on previous ECG, recommend placement of PPM to  ensure his heart rates remain within normal ranges and to ensure AV synchrony.     Today, Mr. Matos reports feels well. Denies any  acute symptoms. Reports lower back pain.  Review of recent labs revealing of stable renal function with Cr 0.94. Hyperkalemic with K 5.5. Repeat BMP pending.     EKG: pending      No past medical history on file.    No past surgical history on file.    Review of patient's allergies indicates:   Allergen Reactions    Opioids - morphine analogues Anaphylaxis     Other reaction(s): Low blood pressure, SHOCK, UNSPECIFIED    Aspirin     Nsaids (non-steroidal anti-inflammatory drug)      Other reaction(s): Other (See Comments)    Tramadol Hallucinations    Penicillins Nausea Only     Other reaction(s): nausa       No current facility-administered medications on file prior to encounter.     Current Outpatient Medications on File Prior to Encounter   Medication Sig    atorvastatin (LIPITOR) 80 MG tablet Take 1 tablet (80 mg total) by mouth every evening.    bimatoprost (LUMIGAN) 0.01 % Drop     brimonidine 0.2% (ALPHAGAN) 0.2 % Drop 1 drop.    calcitonin,salmon,synthetic (CALCITONIN, SALMON, NASL)     calcium carbonate/vitamin D3 (CALCIUM 500 + D, D3, ORAL) Take by mouth Daily.    clopidogreL (PLAVIX) 75 mg tablet TAKE 1 TABLET EVERY DAY    cyanocobalamin, vitamin B-12, 5,000 mcg Subl Place 1,200 mcg under the tongue Daily.    dorzolamide (TRUSOPT) 2 % ophthalmic solution Place 1 drop into both eyes Daily.    ferrous gluconate (FERGON) 324 MG tablet Take 324 mg by mouth daily with breakfast.    finasteride (PROSCAR) 5 mg tablet Take 5 mg by mouth once daily.    furosemide (LASIX) 40 MG tablet Take 40 mg by mouth.    levocetirizine (XYZAL) 5 MG tablet Take 5 mg by mouth every evening.    multivitamin (THERAGRAN) per tablet Take 1 tablet by mouth once daily.    tamsulosin (FLOMAX) 0.4 mg Cap Take 0.4 mg by mouth once daily.    vit C/E/Zn/coppr/lutein/zeaxan (PRESERVISION AREDS-2 ORAL) Take by mouth Daily.     Family History    None       Tobacco Use    Smoking status: Former Smoker    Smokeless tobacco:  Never Used    Tobacco comment: quit smoking in 80's   Substance and Sexual Activity    Alcohol use: Not Currently    Drug use: Not on file    Sexual activity: Not on file     Review of Systems   Constitutional: Positive for malaise/fatigue.   Cardiovascular:  Positive for dyspnea on exertion. Negative for chest pain, irregular heartbeat, leg swelling, palpitations and syncope.   Respiratory:  Positive for shortness of breath.    Hematologic/Lymphatic: Negative for bleeding problem.   Musculoskeletal:  Positive for back pain.        SI joint    Neurological:  Negative for light-headedness.   Objective:     Vital Signs (Most Recent):    Vital Signs (24h Range):             There is no height or weight on file to calculate BMI.            Physical Exam  Vitals reviewed.   Constitutional:       Appearance: He is well-developed.   Cardiovascular:      Rate and Rhythm: Normal rate and regular rhythm.      Pulses: Intact distal pulses.      Heart sounds: Normal heart sounds, S1 normal and S2 normal.   Pulmonary:      Effort: Pulmonary effort is normal.      Breath sounds: Normal breath sounds.   Musculoskeletal:         General: Normal range of motion.   Skin:     General: Skin is warm and dry.   Neurological:      Mental Status: He is alert and oriented to person, place, and time.       Assessment and Plan:     Complete heart block  - Prior to procedure, we discussed the alternatives, benefits and risks of the procedure including pain, infection, bleeding, injury to lung causing pneumothorax requiring tube placement, injury to heart valves, puncture of the heart leading to pericardial effusion or tamponade requiring tube drainage, heart attack, stroke and death.  Mr. Matos voices understanding and all questions have been answered.   - LUE venogram   - Proceed with dual chamber PPM vs leadless Micra PPM  - Anesthesia for sedation                 Jacquelyn Hale NP  Cardiac Electrophysiology  [facility] - Short Stay  Cardiac Unit

## 2022-03-16 NOTE — HPI
Jesus Matos Sr. presents to short stay cardiac unit on 03/16/2022 for planned dual chamber PPM vs Micra with Dr. Garcia.     Mr. Matos is a 89 y.o. 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 He has a past medical history significant for symptomatic bradycardia with periods of complete heart block, coronary artery disease, COPD, and kyphosis.     He follows in general cardiology with Dr. Soliz and receives most of his care from [facility]. He was recently seen by Dr. Soliz on 2/28/2022 with complaints of slightly worsened shortness of breath, generalized fatigue, and transient episodes of lightheadedness. He was noted on an ECG at that time to have complete heart block and was referred for pacemaker implantation.      Mr. Matos was then seen by Dr. Garcia for further evaluation. In discussion, he reported fatigue and periods of lightheadedness. He denied any episodes of syncope/presyncope, chest pain or chest discomfort, palpitations, nausea or vomiting, orthopnea, or PND. He reports that he previously experienced mild lower extremity edema, but remains stable on his current diuretic dose with no further episodes of edema. He reports baseline mild shortness of breath and dyspnea with exertion, and feels that this is slightly worse than his baseline COPD.   Dr. Garcia discussed the process of age-related conduction system fibrosis and the concept of sinus node dysfunction. Given evidence of CHB on previous ECG, recommend placement of PPM to  ensure his heart rates remain within normal ranges and to ensure AV synchrony.     Today, Mr. Matos reports feels well. Denies any acute symptoms. Reports lower back pain.  Review of recent labs revealing of stable renal function with Cr 0.94. Hyperkalemic with K 5.5. Repeat BMP pending.     EKG:  My independent visualization of most recent EKG is

## 2022-03-16 NOTE — DISCHARGE SUMMARY
[facility] - [facility]iology  Cardiac Electrophysiology  Discharge Summary      Patient Name: Jesus Matos Sr.  MRN: [id number]  Admission Date: 3/16/2022  Hospital Length of Stay: 0 days  Discharge Date and Time:  03/16/2022 4:57 PM  Attending Physician: HUMERA Garcia MD    Discharging Provider: Jacquelyn Hale NP  Primary Care Physician: Carlos Carter MD    HPI:   Jesus Matos Sr. presents to short stay cardiac unit on 03/16/2022 for planned dual chamber PPM vs Micra with Dr. Garcia.     Mr. Matos is a 89 y.o. 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 He has a past medical history significant for symptomatic bradycardia with periods of complete heart block, coronary artery disease, COPD, and kyphosis.     He follows in general cardiology with Dr. Soliz and receives most of his care from [facility]. He was recently seen by Dr. Soliz on 2/28/2022 with complaints of slightly worsened shortness of breath, generalized fatigue, and transient episodes of lightheadedness. He was noted on an ECG at that time to have complete heart block and was referred for pacemaker implantation.      Mr. Matos was then seen by Dr. Garcia for further evaluation. In discussion, he reported fatigue and periods of lightheadedness. He denied any episodes of syncope/presyncope, chest pain or chest discomfort, palpitations, nausea or vomiting, orthopnea, or PND. He reports that he previously experienced mild lower extremity edema, but remains stable on his current diuretic dose with no further episodes of edema. He reports baseline mild shortness of breath and dyspnea with exertion, and feels that this is slightly worse than his baseline COPD.   Dr. Garcia discussed the process of age-related conduction system fibrosis and the concept of sinus node dysfunction. Given evidence of CHB on previous ECG, recommend placement of PPM to  ensure his heart rates remain within normal ranges  and to ensure AV synchrony.     Today, Mr. Matos reports feels well. Denies any acute symptoms. Reports lower back pain.  Review of recent labs revealing of stable renal function with Cr 0.94. Hyperkalemic with K 5.5. Repeat BMP pending.     EKG:  My independent visualization of most recent EKG is          Procedure(s) (LRB):  [id number] (N/A)     Indwelling Lines/Drains at time of discharge:  Lines/Drains/Airways     None             Hospital Course:  Mr. Matos underwent successful placement of Micra leadless PPM. He tolerated the procedure well and there were no acute complications. Bilateral groin sites intact.  No bleeding or hematoma noted. Completed bedrest x 6 hours. Post-procedure CXR stable with confirmation of device placement. ECG and tele reviewed without evidence of arrhythmias. Completed intra op abx, plan to discharge home on doxycycline x 5 days for surgical prophylaxis.    Mr. Matos was assessed at bedside prior to discharge. He reported feeling well and denied chest discomfort, shortness of breath, palpitations, lightheadedness, or any other acute symptoms. Discharge instructions were discussed and all questions were answered. Mr. Matos was discharged home in stable condition.     Pending Diagnostic Studies:     None          Final Active Diagnoses:    Diagnosis Date Noted POA    Complete heart block [I44.2] 03/05/2022 Yes      Problems Resolved During this Admission:     Complete heart block  s/p Micra leadless PPM    Plan:  - Doxycycline 100 mg BID x 5 days for surgical prophylaxis  - Resume PTA medications   - Follow up in device clinic in 1 week (3/30/22)  - Follow up with Dr. Garcia in 3 months   - Discharge plans/instructions discussed with patient who verbalized understanding and agreement of plans of care.           Discharged Condition: good    Disposition:     Follow Up:   Follow-up Information     A Sophia Garcia MD Follow up in 3 month(s).     Specialty: Electrophysiology  Why: s/p leadless Micra PPM  Contact information:  [address]  [facility] [address]  [phone]                       Patient Instructions:      Other restrictions (specify):   Order Comments: Restrictions  1. Do not strain or lift anything greater than 5 lb for 1 week.   2. Do not drive or operate any dangerous machinery for minimally 24 hours, but optimally 48-72 hours since you were given general anesthesia.   3. After 24 hours, a shower is allowed.   4. Clean punctures sites with mild soap and water. Allow to air dry. No need to reapply a bandage. Avoid lotions or ointments on these sites.  5. Once the skin has healed (1 week), bathing in a tub, swimming, or hot tub is allowed.   6. Inspect the groin site daily and report to the physician any signs of infection at the site: redness, pain, fever >100.4, unusual pain at the access site or affected extremity, unusual swelling at the access site, or any yellow, white or green drainage.     Seek immediate medical attention:  Bleeding from the puncture site that you cannot stop by doing the following:   Relax and lie down right away. Keep your leg flat and apply firm pressure to the site using your fingers and a gauze pad. Keep the pressure on for 10 minutes. Continue this until the bleeding stops. This may take awhile. When bleeding stops, cover the site with a sterile bandage and keep your leg still as much as possible.     Go to the Emergency Department if you develop:   -Severe bleeding   -Acute Weakness or numbness   -Visual, gait or speech disturbances   -New chest pain, palpitations, shortness of breath, fainting       Any need to reschedule appointments, or any questions regarding your procedures should be addressed directly with the Arrhythmia Department Nurses at the following phone number: [phone].     Notify your health care provider if you experience any of the following:  temperature >100.4     Notify your  health care provider if you experience any of the following:  severe uncontrolled pain     Notify your health care provider if you experience any of the following:  redness, tenderness, or signs of infection (pain, swelling, redness, odor or green/yellow discharge around incision site)     Notify your health care provider if you experience any of the following:  difficulty breathing or increased cough     Notify your health care provider if you experience any of the following:  persistent dizziness, light-headedness, or visual disturbances     Activity as tolerated     Medications:  Reconciled Home Medications:      Medication List      START taking these medications    doxycycline 100 MG Cap  Commonly known as: VIBRAMYCIN  Take 1 capsule (100 mg total) by mouth every 12 (twelve) hours. for 5 days        CONTINUE taking these medications    acetaminophen 500 MG tablet  Commonly known as: TYLENOL  Take 500 mg by mouth every 6 (six) hours as needed for Pain.     atorvastatin 80 MG tablet  Commonly known as: LIPITOR  Take 1 tablet (80 mg total) by mouth every evening.     brimonidine 0.2% 0.2 % Drop  Commonly known as: ALPHAGAN  1 drop.     CALCITONIN (SALMON) NASL     CALCIUM 500 + D (D3) ORAL  Take by mouth Daily.     clopidogreL 75 mg tablet  Commonly known as: PLAVIX  TAKE 1 TABLET EVERY DAY     cyanocobalamin (vitamin B-12) 5,000 mcg Subl  Place 1,200 mcg under the tongue Daily.     dorzolamide 2 % ophthalmic solution  Commonly known as: TRUSOPT  Place 1 drop into both eyes Daily.     ferrous gluconate 324 MG tablet  Commonly known as: FERGON  Take 324 mg by mouth daily with breakfast.     finasteride 5 mg tablet  Commonly known as: PROSCAR  Take 5 mg by mouth once daily.     furosemide 40 MG tablet  Commonly known as: LASIX  Take 40 mg by mouth.     levocetirizine 5 MG tablet  Commonly known as: XYZAL  Take by mouth once daily.     LUMIGAN 0.01 % Drop  Generic drug: bimatoprost     multivitamin per  tablet  Commonly known as: THERAGRAN  Take by mouth once daily.     PRESERVISION AREDS-2 ORAL  Take by mouth Daily.     tamsulosin 0.4 mg Cap  Commonly known as: FLOMAX  Take 0.4 mg by mouth once daily.     tiZANidine 2 MG tablet  Commonly known as: ZANAFLEX  Take 1 mg by mouth every 6 (six) hours as needed.     TRELEGY ELLIPTA 100-62.5-25 mcg Dsdv  Generic drug: fluticasone-umeclidin-vilanter  Inhale 1 puff into the lungs once daily.            Time spent on the discharge of patient: 25 minutes    Jacquelyn Hale NP  Cardiac Electrophysiology  [facility] - Cardiology

## 2022-03-16 NOTE — ANESTHESIA POSTPROCEDURE EVALUATION
Anesthesia Post Evaluation    Patient: Jesus Matos     Procedure(s) Performed: Procedure(s) (LRB):  [id number] (N/A)    Final Anesthesia Type: general (Natural airway)      Patient location during evaluation: PACU  Patient participation: Yes- Able to Participate  Level of consciousness: awake and alert  Post-procedure vital signs: reviewed and stable  Pain management: adequate  Airway patency: patent    PONV status at discharge: No PONV  Anesthetic complications: no      Cardiovascular status: hemodynamically stable  Respiratory status: unassisted  Hydration status: euvolemic  Follow-up not needed.          Vitals Value Taken Time   /77 03/16/22 1616   Temp 36.1 °C (97 °F) 03/16/22 1515   Pulse 58 03/16/22 1625   Resp 30 03/16/22 1625   SpO2 100 % 03/16/22 1625   Vitals shown include unvalidated device data.      No case tracking events are documented in the log.      Pain/Akash Score: Akash Score: 9 (3/16/2022  3:15 PM)

## 2022-03-16 NOTE — OP NOTE
EP Procedure - Micra AV Leadless Pacemaker Implantation     Jesus Matos Sr.  3/16/2022     : Sophia Garcia MD  Assistant(s): None     Indication:  1. Periods of complete heart block  2. Symptomatic bradycardia    Complications: None apparent at conclusion of the procedure     Anesthesia: Monitored anesthesia care     Estimated Blood Loss: Minimal     Procedure:  1. Insertion of a Micra AV leadless pacemaker.  2. Placement of a temporary pacing wire from the left femoral vein for the duration of the case, removed at case conclusion.      Procedure Details:  The patient was brought to the electrophysiology laboratory in a fasted state. A time out was performed to confirm patient identity, the proposed procedure and appropriate venous access site, and to verify signed informed consent. Antibiotics were administered intravenously prior to incision for antibiotic prophylaxis. Monitored anesthesia care was administered. The bilateral femoral groins were prepped and draped in sterile fashion. Local anesthesia was administered in the dermal and soft tissue region of the left and right femoral veins. Utilizing ultrasound guidance with direct visualization, the left femoral vein was cannulated via the modified Seldinger technique with one venipuncture. An Amplatz extra stiff wire was advanced into the superior vena cava, with placement of an SR-0 long sheath, given his significant vascular tortuousities. A Trent quadripolar catheter was advanced to the RV apex for temporary pacing during the case. Utilizing ultrasound guidance with direct visualization, the right femoral vein was cannulated via the modified Seldinger technique with one venipuncture. An Amplatz extra stiff wire was advanced into the superior vena cava. Serial dilations were made with an 8Fr short venous sheath, followed by a 14Fr, 16Fr, and 18Fr dilator. The Medtronic Micra 27Fr introducer sheath was then advanced over the guidewire. A  heparin bolus of 3,000 U was administered, with initiation of a heparin infusion. The sheath apparatus was was advanced to the mid right atrium under fluoroscopic guidance. Under fluoroscopic guidance, the Medtronic Micra AV delivery sheath was advanced through the introducer sheath across the tricuspid valve into the right ventricle. The tip of the delivery sheath was rotated clockwise into the interventricular septum. Contrast injection was then performed in both the BORJAS and Haitian views in order to confirm septal apposition. The sheath was then flushed with saline to remove contrast remnant. The Micra AV pacing capsule was deployed. Assessment revealed acceptable sensitivity, impedance, and threshold values. Cine fluoroscopy during traction on the pacing capsule confirmed engagement of at least two tines in two views. Repeat interrogation revealed stable and acceptable parameters. The tether was then flossed, and the pacing capsule was released from the delivery sheath. Final fluoroscopy evidenced stable positioning of the Micra AV leadless pacemaker with no evidence of pericardial effusion at case conclusion. The sheath was then retracted into the inferior vena cava, and ultimately removed from the body. Bilateral stop-cocked figure-of-eight sutures were placed at the femoral venotomy sites with excellent hemostasis. There were no immediate complications.    Conclusions:  1. Successful implantation of a Medtronic Micra AV leadless pacemaker.  2. Placement of a temporary pacing wire from the left femoral vein for the duration of the case, removed at case conclusion.     Recommendations:  1. Bedrest for 6 hours.   2. The bilateral figure-of-eight sutures may be safely removed after 4 hours of hemostasis.    3. Routine follow-up in Device Clinic in one week.    HUMERA Garcia MD  EP Attending

## 2022-03-16 NOTE — PLAN OF CARE
Patient arrived to room. PIV placed, labs sent. EKG done and in chart. Admit assessment completed. Plan of care discussed with patient. VSS. A&Ox4. Pt denies any complaints or pain at this time. Nurse call bell within reach. Will continue to monitor.

## 2022-03-16 NOTE — HOSPITAL COURSE
Mr. Matos underwent successful placement of Micra leadless PPM. He tolerated the procedure well and there were no acute complications. Bilateral groin sites intact.  No bleeding or hematoma noted. Completed bedrest x 6 hours. Post-procedure CXR stable with confirmation of device placement. ECG and tele reviewed without evidence of arrhythmias. Completed intra op abx, plan to discharge home on doxycycline x 5 days for surgical prophylaxis.    Mr. Matos was assessed at bedside prior to discharge. He reported feeling well and denied chest discomfort, shortness of breath, palpitations, lightheadedness, or any other acute symptoms. Discharge instructions were discussed and all questions were answered. Mr. Matos was discharged home in stable condition.      11

## 2022-03-16 NOTE — CARE UPDATE
Brief EP Update Note    Patient was found to have bleeding from the right groin site after completion of bed rest. Stitch reported to have been removed at approximately 3 pm. Patient's bleeding was haled by approximately 10 minutes of firm pressure. Small, soft hematoma appreciated at the right groin site. Patient will be kept overnight for observation per family request (daughter).     Please call with questions or concerns.     Raphael Aponte MD  Cardiology PGY4  [facility]

## 2022-03-16 NOTE — ANESTHESIA PREPROCEDURE EVALUATION
03/16/2022  Jesus Matos Sr. is a 89 y.o., male.    Pre-operative evaluation for Procedure(s) (LRB):  INSERTION, CARDIAC PACEMAKER, DUAL CHAMBER (Left)  Venogram, EP Lab (Bilateral)  [id number] (N/A)    Jesus Matos Sr. is a 89 y.o. male     Patient Active Problem List   Diagnosis    Macular degeneration    Arteriosclerosis of coronary artery    Carcinoma of prostate    Chronic obstructive pulmonary disease    Gastroesophageal reflux disease    Hyperlipidemia    Kyphosis    Vertigo    Acute diastolic congestive heart failure    Complete heart block    Symptomatic bradycardia       Review of patient's allergies indicates:   Allergen Reactions    Opioids - morphine analogues Anaphylaxis     Other reaction(s): Low blood pressure, SHOCK, UNSPECIFIED    Penicillins Hives     Other reaction(s): nausa    Aspirin     Nsaids (non-steroidal anti-inflammatory drug)      Other reaction(s): Other (See Comments)    Tramadol Hallucinations       No current facility-administered medications on file prior to encounter.     Current Outpatient Medications on File Prior to Encounter   Medication Sig Dispense Refill    acetaminophen (TYLENOL) 500 MG tablet Take 500 mg by mouth every 6 (six) hours as needed for Pain.      atorvastatin (LIPITOR) 80 MG tablet Take 1 tablet (80 mg total) by mouth every evening. 90 tablet 3    bimatoprost (LUMIGAN) 0.01 % Drop       brimonidine 0.2% (ALPHAGAN) 0.2 % Drop 1 drop.      calcitonin,salmon,synthetic (CALCITONIN, SALMON, NASL)       calcium carbonate/vitamin D3 (CALCIUM 500 + D, D3, ORAL) Take by mouth Daily.      clopidogreL (PLAVIX) 75 mg tablet TAKE 1 TABLET EVERY DAY 90 tablet 3    cyanocobalamin, vitamin B-12, 5,000 mcg Subl Place 1,200 mcg under the tongue Daily.      dorzolamide (TRUSOPT) 2 % ophthalmic solution Place 1  drop into both eyes Daily.      ferrous gluconate (FERGON) 324 MG tablet Take 324 mg by mouth daily with breakfast.      finasteride (PROSCAR) 5 mg tablet Take 5 mg by mouth once daily.      fluticasone-umeclidin-vilanter (TRELEGY ELLIPTA) 100-62.5-25 mcg DsDv Inhale 1 puff into the lungs once daily.      furosemide (LASIX) 40 MG tablet Take 40 mg by mouth.      levocetirizine (XYZAL) 5 MG tablet Take by mouth once daily.      multivitamin (THERAGRAN) per tablet Take by mouth once daily.      tamsulosin (FLOMAX) 0.4 mg Cap Take 0.4 mg by mouth once daily.      tiZANidine (ZANAFLEX) 2 MG tablet Take 1 mg by mouth every 6 (six) hours as needed.      vit C/E/Zn/coppr/lutein/zeaxan (PRESERVISION AREDS-2 ORAL) Take by mouth Daily.         Past Surgical History:   Procedure Laterality Date    adenoidectomy      10-12 years old    CHOLECYSTECTOMY  2009    EYE SURGERY      HERNIA REPAIR      inguinal Oct 2006    JOINT REPLACEMENT      TONSILLECTOMY      TOTAL KNEE ARTHROPLASTY Bilateral        Social History     Socioeconomic History    Marital status:    Tobacco Use    Smoking status: Former Smoker     Years: 20.00    Smokeless tobacco: Never Used    Tobacco comment: quit smoking in 's   Substance and Sexual Activity    Alcohol use: Yes     Comment: 2-3 [employer]    Drug use: Never    Sexual activity: Not Currently         CBC:   Recent Labs     22  1153   WBC 8.9   RBC 4.24*   HGB 12.8*   HCT 39.1*      MCV 92.3   MCH 30.1   MCHC 32.6       CMP:   Recent Labs     22  1153   *   K 5.5*   *   CO2 25   BUN 35.0*   CREATININE 0.94   *   CALCIUM 9.4       INR  Recent Labs     22  1153   INR 1.1   APTT 27.9           Diagnostic Studies:      EKD Echo:  No results found for this or any previous visit.        Pre-op Assessment    I have reviewed the Patient Summary Reports.    I have reviewed the NPO Status.   I have reviewed the  Medications.     Review of Systems  Anesthesia Hx:  No problems with previous Anesthesia   Denies Personal Hx of Anesthesia complications.   Cardiovascular:   Exercise tolerance: poor CAD  CABG/stent Dysrhythmias  ECG has been reviewed.    Pulmonary:   COPD    Hepatic/GI:   GERD, well controlled    Neurological:   Denies CVA. Denies Seizures.        Physical Exam  General: Cooperative and Alert    Airway:  Mallampati: II   Mouth Opening: Small, but > 3cm  TM Distance: Normal  Tongue: Normal  Neck ROM: Extension Decreased    Dental:  Dentures        Anesthesia Plan  Type of Anesthesia, risks & benefits discussed:    Anesthesia Type: Gen Natural Airway  Intra-op Monitoring Plan: Standard ASA Monitors  Post Op Pain Control Plan: multimodal analgesia and IV/PO Opioids PRN  Induction:  IV  Informed Consent: Informed consent signed with the Patient and all parties understand the risks and agree with anesthesia plan.  All questions answered.   ASA Score: 3  Day of Surgery Review of History & Physical: H&P Update referred to the surgeon/provider.    Ready For Surgery From Anesthesia Perspective.     .

## 2022-03-16 NOTE — Clinical Note
37 ml of contrast were injected throughout the case. 63 mL of contrast was the total wasted during the case. 100 mL was the total amount used during the case.

## 2022-03-16 NOTE — ASSESSMENT & PLAN NOTE
s/p Micra leadless PPM    Plan:  - Doxycycline 100 mg BID x 5 days for surgical prophylaxis  - Resume PTA medications   - Follow up in device clinic in 1 week (3/30/22)  - Follow up with Dr. Garcia in 3 months   - Discharge plans/instructions discussed with patient who verbalized understanding and agreement of plans of care.

## 2022-03-16 NOTE — SUBJECTIVE & OBJECTIVE
No past medical history on file.    No past surgical history on file.    Review of patient's allergies indicates:   Allergen Reactions    Opioids - morphine analogues Anaphylaxis     Other reaction(s): Low blood pressure, SHOCK, UNSPECIFIED    Aspirin     Nsaids (non-steroidal anti-inflammatory drug)      Other reaction(s): Other (See Comments)    Tramadol Hallucinations    Penicillins Nausea Only     Other reaction(s): nausa       No current facility-administered medications on file prior to encounter.     Current Outpatient Medications on File Prior to Encounter   Medication Sig    atorvastatin (LIPITOR) 80 MG tablet Take 1 tablet (80 mg total) by mouth every evening.    bimatoprost (LUMIGAN) 0.01 % Drop     brimonidine 0.2% (ALPHAGAN) 0.2 % Drop 1 drop.    calcitonin,salmon,synthetic (CALCITONIN, SALMON, NASL)     calcium carbonate/vitamin D3 (CALCIUM 500 + D, D3, ORAL) Take by mouth Daily.    clopidogreL (PLAVIX) 75 mg tablet TAKE 1 TABLET EVERY DAY    cyanocobalamin, vitamin B-12, 5,000 mcg Subl Place 1,200 mcg under the tongue Daily.    dorzolamide (TRUSOPT) 2 % ophthalmic solution Place 1 drop into both eyes Daily.    ferrous gluconate (FERGON) 324 MG tablet Take 324 mg by mouth daily with breakfast.    finasteride (PROSCAR) 5 mg tablet Take 5 mg by mouth once daily.    furosemide (LASIX) 40 MG tablet Take 40 mg by mouth.    levocetirizine (XYZAL) 5 MG tablet Take 5 mg by mouth every evening.    multivitamin (THERAGRAN) per tablet Take 1 tablet by mouth once daily.    tamsulosin (FLOMAX) 0.4 mg Cap Take 0.4 mg by mouth once daily.    vit C/E/Zn/coppr/lutein/zeaxan (PRESERVISION AREDS-2 ORAL) Take by mouth Daily.     Family History    None       Tobacco Use    Smoking status: Former Smoker    Smokeless tobacco: Never Used    Tobacco comment: quit smoking in 80's   Substance and Sexual Activity    Alcohol use: Not Currently    Drug use: Not on file    Sexual activity: Not on file     Review of Systems    Constitutional: Positive for malaise/fatigue.   Cardiovascular:  Positive for dyspnea on exertion. Negative for chest pain, irregular heartbeat, leg swelling, palpitations and syncope.   Respiratory:  Positive for shortness of breath.    Hematologic/Lymphatic: Negative for bleeding problem.   Musculoskeletal:  Positive for back pain.        SI joint    Neurological:  Negative for light-headedness.   Objective:     Vital Signs (Most Recent):    Vital Signs (24h Range):             There is no height or weight on file to calculate BMI.            Physical Exam  Vitals reviewed.   Constitutional:       Appearance: He is well-developed.   Cardiovascular:      Rate and Rhythm: Normal rate and regular rhythm.      Pulses: Intact distal pulses.      Heart sounds: Normal heart sounds, S1 normal and S2 normal.   Pulmonary:      Effort: Pulmonary effort is normal.      Breath sounds: Normal breath sounds.   Musculoskeletal:         General: Normal range of motion.   Skin:     General: Skin is warm and dry.   Neurological:      Mental Status: He is alert and oriented to person, place, and time.

## 2022-03-16 NOTE — ASSESSMENT & PLAN NOTE
- Prior to procedure, we discussed the alternatives, benefits and risks of the procedure including pain, infection, bleeding, injury to lung causing pneumothorax requiring tube placement, injury to heart valves, puncture of the heart leading to pericardial effusion or tamponade requiring tube drainage, heart attack, stroke and death.  Radha Matos voices understanding and all questions have been answered.   - LUE venogram   - Proceed with dual chamber PPM vs leadless Micra PPM  - Anesthesia for sedation

## 2022-03-17 ENCOUNTER — PATIENT MESSAGE (OUTPATIENT)
Dept: ELECTROPHYSIOLOGY | Facility: CLINIC | Age: 87
End: 2022-03-17
Payer: MEDICARE

## 2022-03-17 VITALS
WEIGHT: 140.63 LBS | RESPIRATION RATE: 20 BRPM | OXYGEN SATURATION: 94 % | DIASTOLIC BLOOD PRESSURE: 69 MMHG | TEMPERATURE: 99 F | HEIGHT: 64 IN | SYSTOLIC BLOOD PRESSURE: 124 MMHG | HEART RATE: 72 BPM | BODY MASS INDEX: 24.01 KG/M2

## 2022-03-17 LAB — POCT GLUCOSE: 120 MG/DL (ref 70–110)

## 2022-03-17 PROCEDURE — 25000003 PHARM REV CODE 250: Performed by: INTERNAL MEDICINE

## 2022-03-17 PROCEDURE — 25000003 PHARM REV CODE 250: Performed by: NURSE PRACTITIONER

## 2022-03-17 RX ORDER — DOXYCYCLINE HYCLATE 100 MG
100 TABLET ORAL EVERY 12 HOURS
Status: DISCONTINUED | OUTPATIENT
Start: 2022-03-17 | End: 2022-03-17 | Stop reason: HOSPADM

## 2022-03-17 RX ADMIN — FERROUS GLUCONATE 324 MG: 324 TABLET ORAL at 09:03

## 2022-03-17 RX ADMIN — FINASTERIDE 5 MG: 5 TABLET, FILM COATED ORAL at 09:03

## 2022-03-17 RX ADMIN — CLOPIDOGREL 75 MG: 75 TABLET, FILM COATED ORAL at 09:03

## 2022-03-17 RX ADMIN — THERA TABS 1 TABLET: TAB at 09:03

## 2022-03-17 RX ADMIN — DOXYCYCLINE HYCLATE 100 MG: 100 TABLET, COATED ORAL at 09:03

## 2022-03-17 NOTE — NURSING
Pt alert and oriented x4. PERRLA intact. Vital signs stable. No report of pain. Pt sitting up in bed on room air without difficulty. Daughter at bedside. Assisted pt with walking. Pt able to walk to the door and back with assistance. Assessed bilateral groin sites and they were clean, dry, and intact. Educated pt and daughter on discharge instructions regarding meds, s/s to report, care needed for bilateral groin sites, heart failure, and follow up. Pt and daughter understood instructions and had no questions or concerns. Removed IV and tele box. Left pt sitting in bed awaiting transport.

## 2022-03-17 NOTE — NURSING
Accept report from Javier RN. Pt transfer from short stay cath lab to Room 325.    Pt is AAOx4. Pt plan of care reviewed at bedside. VS stable. Pt denies any chest pain or palpitation, no s/s of respiratory distress.  Pt has remained free from injury during this shift. Pt bed in low locked position. Call light, urinal and personal belongings within reach. Side rails up x2. Daughter at bedside. Assess bilateral groin no bleeding or hematoma noted. Area is clean, dry, and intact.   Pt was advice to call nurse with any questions or concerns.

## 2022-03-17 NOTE — NURSING TRANSFER
Nursing Transfer Note      3/16/2022     Reason patient is being transferred: overnight observation  Transfer  To room 325    Transfer via strtcher  Transfer with cardiac monitoring and online  Transported by Javier Stern RN  Medicines sent: none  Any special needs or follow-up needed: bedrest until 9pm  Chart send with patient: yes    Notified: daughter at bedside  Patient reassessed at: 1900 on 3/16/2022  Upon arrival to floor:  Pulled patient over to bed. Bedside report transfer and handoff to CAITY Limon. Assessed bilateral groin. No bleeding, no hematoma.

## 2022-03-17 NOTE — PLAN OF CARE
Pt is AAOx4. Throughout shift, no acute distress noted.   Assess bilateral groin site no bleeding or hematoma present. Area is clean, dry, and intact. VS stable, afebrile throughout shift.  Pt denies any chest pain or palpitation, no s/s of respiratory distress.  Call light and personal belongings within reach. Side rails up x2. Pt was advice to call nurse with any questions or concerns.

## 2022-03-17 NOTE — NURSING
Patient up out of bed to use the urinal at bedside. Bleeding noted from right groin. Manual pressure applied. Dr. Aponte notified.

## 2022-03-21 ENCOUNTER — TELEPHONE (OUTPATIENT)
Dept: ELECTROPHYSIOLOGY | Facility: CLINIC | Age: 87
End: 2022-03-21
Payer: MEDICARE

## 2022-03-21 NOTE — TELEPHONE ENCOUNTER
Spoke to: Mr. Matos, and his daughter Sofiya     Does the patient have any concerns, questions about post op instructions? No he is feeling better and said if it wasn't for his back pain he would much better.  He said since the pacemaker he is able to do things without getting winded.   He is able to keep his oxygen level up.       Does the patient have any concerns, questions about wound site? Patient denies any swelling, redness or drainage from the wound sites.       Has the patient resumed medications and/or picked up new prescriptions ordered at discharge? Yes as directed    Does the patient have any other questions regarding their procedure? No questions, he will be here for wound and device check on 3/30/22.           Patient verbalized understanding of the post op instructions and appreciated the call.

## 2022-03-27 ENCOUNTER — PATIENT MESSAGE (OUTPATIENT)
Dept: ELECTROPHYSIOLOGY | Facility: CLINIC | Age: 87
End: 2022-03-27
Payer: MEDICARE

## 2022-03-30 ENCOUNTER — CLINICAL SUPPORT (OUTPATIENT)
Dept: CARDIOLOGY | Facility: HOSPITAL | Age: 87
End: 2022-03-30
Attending: STUDENT IN AN ORGANIZED HEALTH CARE EDUCATION/TRAINING PROGRAM
Payer: MEDICARE

## 2022-03-30 DIAGNOSIS — R00.1 SYMPTOMATIC BRADYCARDIA: ICD-10-CM

## 2022-03-30 DIAGNOSIS — I44.2 COMPLETE HEART BLOCK: ICD-10-CM

## 2022-03-30 PROCEDURE — 93288 INTERROG EVL PM/LDLS PM IP: CPT | Mod: 59

## 2022-03-30 PROCEDURE — 93279 PRGRMG DEV EVAL PM/LDLS PM: CPT | Mod: 26,,, | Performed by: STUDENT IN AN ORGANIZED HEALTH CARE EDUCATION/TRAINING PROGRAM

## 2022-03-30 PROCEDURE — 93279 CARDIAC DEVICE CHECK - IN CLINIC & HOSPITAL: ICD-10-PCS | Mod: 26,,, | Performed by: STUDENT IN AN ORGANIZED HEALTH CARE EDUCATION/TRAINING PROGRAM

## 2022-04-21 ENCOUNTER — OFFICE VISIT (OUTPATIENT)
Dept: CARDIOLOGY | Facility: CLINIC | Age: 87
End: 2022-04-21
Payer: MEDICARE

## 2022-04-21 VITALS
WEIGHT: 128.5 LBS | DIASTOLIC BLOOD PRESSURE: 52 MMHG | HEART RATE: 75 BPM | SYSTOLIC BLOOD PRESSURE: 94 MMHG | BODY MASS INDEX: 21.94 KG/M2 | HEIGHT: 64 IN

## 2022-04-21 DIAGNOSIS — E78.5 HYPERLIPIDEMIA, UNSPECIFIED HYPERLIPIDEMIA TYPE: ICD-10-CM

## 2022-04-21 DIAGNOSIS — I71.40 ABDOMINAL AORTIC ANEURYSM (AAA) WITHOUT RUPTURE: Primary | ICD-10-CM

## 2022-04-21 DIAGNOSIS — Z95.5 STATUS POST CORONARY ARTERY STENT PLACEMENT: ICD-10-CM

## 2022-04-21 DIAGNOSIS — I50.32 CHRONIC DIASTOLIC CONGESTIVE HEART FAILURE: ICD-10-CM

## 2022-04-21 DIAGNOSIS — R00.1 SYMPTOMATIC BRADYCARDIA: ICD-10-CM

## 2022-04-21 DIAGNOSIS — Z95.0 STATUS POST PLACEMENT OF LEADLESS CARDIAC PACEMAKER: ICD-10-CM

## 2022-04-21 DIAGNOSIS — I25.10 ARTERIOSCLEROSIS OF CORONARY ARTERY: ICD-10-CM

## 2022-04-21 DIAGNOSIS — I44.2 COMPLETE HEART BLOCK: ICD-10-CM

## 2022-04-21 PROBLEM — I50.31 ACUTE DIASTOLIC CONGESTIVE HEART FAILURE: Chronic | Status: ACTIVE | Noted: 2022-02-28

## 2022-04-21 PROCEDURE — 1159F MED LIST DOCD IN RCRD: CPT | Mod: CPTII,S$GLB,, | Performed by: INTERNAL MEDICINE

## 2022-04-21 PROCEDURE — 1101F PR PT FALLS ASSESS DOC 0-1 FALLS W/OUT INJ PAST YR: ICD-10-PCS | Mod: CPTII,S$GLB,, | Performed by: INTERNAL MEDICINE

## 2022-04-21 PROCEDURE — 3288F FALL RISK ASSESSMENT DOCD: CPT | Mod: CPTII,S$GLB,, | Performed by: INTERNAL MEDICINE

## 2022-04-21 PROCEDURE — 1160F RVW MEDS BY RX/DR IN RCRD: CPT | Mod: CPTII,S$GLB,, | Performed by: INTERNAL MEDICINE

## 2022-04-21 PROCEDURE — 99999 PR PBB SHADOW E&M-EST. PATIENT-LVL IV: ICD-10-PCS | Mod: PBBFAC,,, | Performed by: INTERNAL MEDICINE

## 2022-04-21 PROCEDURE — 99214 PR OFFICE/OUTPT VISIT, EST, LEVL IV, 30-39 MIN: ICD-10-PCS | Mod: S$GLB,,, | Performed by: INTERNAL MEDICINE

## 2022-04-21 PROCEDURE — 99999 PR PBB SHADOW E&M-EST. PATIENT-LVL IV: CPT | Mod: PBBFAC,,, | Performed by: INTERNAL MEDICINE

## 2022-04-21 PROCEDURE — 1101F PT FALLS ASSESS-DOCD LE1/YR: CPT | Mod: CPTII,S$GLB,, | Performed by: INTERNAL MEDICINE

## 2022-04-21 PROCEDURE — 1160F PR REVIEW ALL MEDS BY PRESCRIBER/CLIN PHARMACIST DOCUMENTED: ICD-10-PCS | Mod: CPTII,S$GLB,, | Performed by: INTERNAL MEDICINE

## 2022-04-21 PROCEDURE — 99214 OFFICE O/P EST MOD 30 MIN: CPT | Mod: S$GLB,,, | Performed by: INTERNAL MEDICINE

## 2022-04-21 PROCEDURE — 1159F PR MEDICATION LIST DOCUMENTED IN MEDICAL RECORD: ICD-10-PCS | Mod: CPTII,S$GLB,, | Performed by: INTERNAL MEDICINE

## 2022-04-21 PROCEDURE — 1126F AMNT PAIN NOTED NONE PRSNT: CPT | Mod: CPTII,S$GLB,, | Performed by: INTERNAL MEDICINE

## 2022-04-21 PROCEDURE — 3288F PR FALLS RISK ASSESSMENT DOCUMENTED: ICD-10-PCS | Mod: CPTII,S$GLB,, | Performed by: INTERNAL MEDICINE

## 2022-04-21 PROCEDURE — 1126F PR PAIN SEVERITY QUANTIFIED, NO PAIN PRESENT: ICD-10-PCS | Mod: CPTII,S$GLB,, | Performed by: INTERNAL MEDICINE

## 2022-04-21 NOTE — PROGRESS NOTES
Subjective:   04/21/2022     Patient ID:  Jesus Matos Sr. is a 89 y.o. male who presents for evaulation of Bradycardia, s/p Pacemaker, and Fatigue  Patient here today for evaluation of abdominal pain and weakness.  The abdominal pain radiates from his left ischial tuberosity across the mid abdomen.  A recent abdominal x-ray showed the presence of a 3.5 cm abdominal aortic aneurysm.  They were concerned this could be causing his pain.  Pain appears to be better lying flat, worse sitting up.  It is positional while sitting up also.    He did have complete heart block and is status post leadless pacemaker insertion.  He has done well subsequent to that.  He has not had any swelling.    He has previously had diastolic heart failure, some of this may have been related to bradycardia.  He continues take furosemide 40 mg daily and has not had swelling.  His blood pressure today is low, 94/52.        Prior note:  Former patient of mine from  with coronary artery disease who presents with increasing shortness of breath.  He was found have an elevated BNP recently and was edematous.  He is now on Lasix 40 mg daily and notes a decrease in swelling and possibly shortness of breath.  He does not have a history of heart failure previously.    He is also noted to be bradycardic.    Recent laboratory work:  7/20/21 shows hemoglobin 11.7, glucose 110, BUN 23, GF for 1 0 for, liver function tests normal.  The TSH is 2.58.  The total cholesterol is 94, triglycerides 50, HDL 44, LDL 42.    01/18/2022 shows hemoglobin 11.5, BUN 25, GFR 99, potassium 3.9, TSH 2.69, glycosylated hemoglobin 5.5, iron saturation 43%,  Total cholesterol 93, triglycerides 72, HDL 44, LDL 37.     Echocardiography performed by me in the office shows overall left ventricular systolic function to be low normal.    EKG showed intermittent complete heart block.  He was referred to EP.  A micro pacemaker was implanted.    Complete heart block  s/p Micra  leadless PPM    Plan:  - Doxycycline 100 mg BID x 5 days for surgical prophylaxis  - Resume PTA medications   - Follow up in device clinic in 1 week (3/30/22)  - Follow up with Dr. Garcia in 3 months   - Discharge plans/instructions discussed with patient who verbalized understanding and agreement of plans of care.           Past Medical History:   Diagnosis Date    Compression fracture of body of thoracic vertebra     COPD (chronic obstructive pulmonary disease)        Review of patient's allergies indicates:   Allergen Reactions    Opioids - morphine analogues Anaphylaxis     Other reaction(s): Low blood pressure, SHOCK, UNSPECIFIED    Penicillins Hives     Other reaction(s): nausa    Aspirin     Nsaids (non-steroidal anti-inflammatory drug)      Other reaction(s): Other (See Comments)    Tramadol Hallucinations         Current Outpatient Medications:     acetaminophen (TYLENOL) 500 MG tablet, Take 500 mg by mouth every 6 (six) hours as needed for Pain., Disp: , Rfl:     atorvastatin (LIPITOR) 80 MG tablet, Take 1 tablet (80 mg total) by mouth every evening., Disp: 90 tablet, Rfl: 3    bimatoprost (LUMIGAN) 0.01 % Drop, Place 1 drop into both eyes every evening., Disp: , Rfl:     brimonidine 0.2% (ALPHAGAN) 0.2 % Drop, Place 1 drop into both eyes 2 (two) times daily., Disp: , Rfl:     calcitonin,salmon,synthetic (CALCITONIN, SALMON, NASL), 1 spray by Each Nostril route Daily., Disp: , Rfl:     calcium carbonate/vitamin D3 (CALCIUM 500 + D, D3, ORAL), Take by mouth Daily., Disp: , Rfl:     clopidogreL (PLAVIX) 75 mg tablet, TAKE 1 TABLET EVERY DAY, Disp: 90 tablet, Rfl: 3    cyanocobalamin, vitamin B-12, 5,000 mcg Subl, Place 1,200 mcg under the tongue Daily., Disp: , Rfl:     dorzolamide (TRUSOPT) 2 % ophthalmic solution, Place 1 drop into both eyes Daily., Disp: , Rfl:     ferrous gluconate (FERGON) 324 MG tablet, Take 324 mg by mouth daily with breakfast., Disp: , Rfl:     finasteride  (PROSCAR) 5 mg tablet, Take 5 mg by mouth once daily., Disp: , Rfl:     fluticasone-umeclidin-vilanter (TRELEGY ELLIPTA) 100-62.5-25 mcg DsDv, Inhale 1 puff into the lungs once daily., Disp: , Rfl:     furosemide (LASIX) 40 MG tablet, Take 40 mg by mouth., Disp: , Rfl:     levocetirizine (XYZAL) 5 MG tablet, Take by mouth once daily., Disp: , Rfl:     multivitamin (THERAGRAN) per tablet, Take by mouth once daily., Disp: , Rfl:     tamsulosin (FLOMAX) 0.4 mg Cap, Take 0.4 mg by mouth once daily., Disp: , Rfl:     vit C/E/Zn/coppr/lutein/zeaxan (PRESERVISION AREDS-2 ORAL), Take by mouth Daily., Disp: , Rfl:      Objective:   Review of Systems   Constitutional: Positive for malaise/fatigue and weight loss.   Cardiovascular: Positive for dyspnea on exertion and leg swelling. Negative for chest pain, claudication, cyanosis, irregular heartbeat, near-syncope, orthopnea, palpitations, paroxysmal nocturnal dyspnea and syncope.   Hematologic/Lymphatic: Bruises/bleeds easily.   Neurological: Positive for vertigo.       Vitals:    04/21/22 1039   BP: (!) 94/52   Pulse: 75       Physical Exam  Vitals reviewed.   Constitutional:       General: He is not in acute distress.     Appearance: He is well-developed.   HENT:      Head: Normocephalic and atraumatic.      Nose: Nose normal.   Eyes:      Conjunctiva/sclera: Conjunctivae normal.      Pupils: Pupils are equal, round, and reactive to light.   Neck:      Vascular: No hepatojugular reflux or JVD.   Cardiovascular:      Rate and Rhythm: Regular rhythm.       Pulses: Intact distal pulses.      Heart sounds: Normal heart sounds. No murmur heard.    No friction rub. No gallop.   Pulmonary:      Effort: Pulmonary effort is normal. No respiratory distress.      Breath sounds: Normal breath sounds. No wheezing or rales.   Chest:      Chest wall: No tenderness.   Abdominal:      General: Bowel sounds are normal. There is no distension.      Palpations: Abdomen is soft.       Tenderness: There is no abdominal tenderness.      No palpable aorta is noted  Musculoskeletal:         General: No tenderness or deformity. Normal range of motion.      Cervical back: Normal range of motion and neck supple.   Skin:     General: Skin is warm and dry.      Findings: No erythema or rash.   Neurological:      Mental Status: He is alert and oriented to person, place, and time.      Cranial Nerves: No cranial nerve deficit.      Motor: No abnormal muscle tone.      Coordination: Coordination normal.   Psychiatric:         Behavior: Behavior normal.         Thought Content: Thought content normal.         Judgment: Judgment normal.                 Assessment and Plan:     Abdominal aortic aneurysm (AAA) without rupture  Comments:  Small abdominal aortic aneurysm present on abdominal x-ray, does not appear to be symptomatic  Abdominal pain appears musculoskeletal    Complete heart block  Comments:  Corrected after pacemaker insertion    Arteriosclerosis of coronary artery  Comments:  Currently asymptomatic    Chronic diastolic congestive heart failure  Comments:  Asymptomatic, change diuretics to as needed    Hyperlipidemia, unspecified hyperlipidemia type  Comments:  On high-dose statin therapy    Symptomatic bradycardia  Comments:  Resolved    Status post coronary artery stent placement    Status post placement of leadless cardiac pacemaker       Abdominal pain appears nonvascular, note forwarded to Dr. Gunn     No follow-ups on file.

## 2022-06-01 ENCOUNTER — PATIENT MESSAGE (OUTPATIENT)
Dept: CARDIOLOGY | Facility: CLINIC | Age: 87
End: 2022-06-01
Payer: MEDICARE

## 2022-06-01 RX ORDER — FUROSEMIDE 40 MG/1
40 TABLET ORAL
Qty: 36 TABLET | Refills: 3 | Status: SHIPPED | OUTPATIENT
Start: 2022-06-01 | End: 2023-01-01

## 2022-06-14 ENCOUNTER — CLINICAL SUPPORT (OUTPATIENT)
Dept: CARDIOLOGY | Facility: HOSPITAL | Age: 87
End: 2022-06-14
Payer: MEDICARE

## 2022-06-14 DIAGNOSIS — I44.2 ATRIOVENTRICULAR BLOCK, COMPLETE: ICD-10-CM

## 2022-06-14 DIAGNOSIS — Z95.0 PRESENCE OF CARDIAC PACEMAKER: ICD-10-CM

## 2022-06-14 PROCEDURE — 93294 REM INTERROG EVL PM/LDLS PM: CPT | Mod: ,,, | Performed by: STUDENT IN AN ORGANIZED HEALTH CARE EDUCATION/TRAINING PROGRAM

## 2022-06-14 PROCEDURE — 93294 CARDIAC DEVICE CHECK - REMOTE: ICD-10-PCS | Mod: ,,, | Performed by: STUDENT IN AN ORGANIZED HEALTH CARE EDUCATION/TRAINING PROGRAM

## 2022-06-14 PROCEDURE — 93296 REM INTERROG EVL PM/IDS: CPT | Performed by: STUDENT IN AN ORGANIZED HEALTH CARE EDUCATION/TRAINING PROGRAM

## 2022-06-15 ENCOUNTER — TELEPHONE (OUTPATIENT)
Dept: ELECTROPHYSIOLOGY | Facility: CLINIC | Age: 87
End: 2022-06-15
Payer: MEDICARE

## 2022-06-17 ENCOUNTER — OFFICE VISIT (OUTPATIENT)
Dept: ELECTROPHYSIOLOGY | Facility: CLINIC | Age: 87
End: 2022-06-17
Payer: MEDICARE

## 2022-06-17 ENCOUNTER — CLINICAL SUPPORT (OUTPATIENT)
Dept: CARDIOLOGY | Facility: HOSPITAL | Age: 87
End: 2022-06-17
Attending: STUDENT IN AN ORGANIZED HEALTH CARE EDUCATION/TRAINING PROGRAM
Payer: MEDICARE

## 2022-06-17 ENCOUNTER — HOSPITAL ENCOUNTER (OUTPATIENT)
Dept: CARDIOLOGY | Facility: CLINIC | Age: 87
Discharge: HOME | End: 2022-06-17
Payer: MEDICARE

## 2022-06-17 VITALS
WEIGHT: 128.5 LBS | HEIGHT: 64 IN | BODY MASS INDEX: 21.94 KG/M2 | SYSTOLIC BLOOD PRESSURE: 128 MMHG | DIASTOLIC BLOOD PRESSURE: 62 MMHG | HEART RATE: 68 BPM

## 2022-06-17 DIAGNOSIS — I25.10 ARTERIOSCLEROSIS OF CORONARY ARTERY: ICD-10-CM

## 2022-06-17 DIAGNOSIS — M40.209 KYPHOSIS, UNSPECIFIED KYPHOSIS TYPE, UNSPECIFIED SPINAL REGION: ICD-10-CM

## 2022-06-17 DIAGNOSIS — I44.2 COMPLETE HEART BLOCK: Primary | ICD-10-CM

## 2022-06-17 DIAGNOSIS — K21.9 GASTROESOPHAGEAL REFLUX DISEASE, UNSPECIFIED WHETHER ESOPHAGITIS PRESENT: ICD-10-CM

## 2022-06-17 DIAGNOSIS — R00.1 SYMPTOMATIC BRADYCARDIA: ICD-10-CM

## 2022-06-17 DIAGNOSIS — C61 CARCINOMA OF PROSTATE: ICD-10-CM

## 2022-06-17 DIAGNOSIS — I44.2 COMPLETE HEART BLOCK: ICD-10-CM

## 2022-06-17 DIAGNOSIS — Z95.0 STATUS POST PLACEMENT OF LEADLESS CARDIAC PACEMAKER: ICD-10-CM

## 2022-06-17 DIAGNOSIS — J44.9 CHRONIC OBSTRUCTIVE PULMONARY DISEASE, UNSPECIFIED COPD TYPE: ICD-10-CM

## 2022-06-17 DIAGNOSIS — E78.2 MIXED HYPERLIPIDEMIA: ICD-10-CM

## 2022-06-17 DIAGNOSIS — I45.9 HEART BLOCK: ICD-10-CM

## 2022-06-17 DIAGNOSIS — I25.10 CORONARY ARTERY DISEASE INVOLVING NATIVE CORONARY ARTERY OF NATIVE HEART WITHOUT ANGINA PECTORIS: ICD-10-CM

## 2022-06-17 DIAGNOSIS — I71.40 ABDOMINAL AORTIC ANEURYSM (AAA) WITHOUT RUPTURE: ICD-10-CM

## 2022-06-17 DIAGNOSIS — I50.32 CHRONIC DIASTOLIC CONGESTIVE HEART FAILURE: ICD-10-CM

## 2022-06-17 DIAGNOSIS — Z95.5 STATUS POST CORONARY ARTERY STENT PLACEMENT: ICD-10-CM

## 2022-06-17 DIAGNOSIS — Z95.0 PACEMAKER: ICD-10-CM

## 2022-06-17 PROCEDURE — 93286 PERI-PX EVAL PM/LDLS PM IP: CPT | Mod: 59

## 2022-06-17 PROCEDURE — 3288F PR FALLS RISK ASSESSMENT DOCUMENTED: ICD-10-PCS | Mod: CPTII,S$GLB,, | Performed by: STUDENT IN AN ORGANIZED HEALTH CARE EDUCATION/TRAINING PROGRAM

## 2022-06-17 PROCEDURE — 99214 PR OFFICE/OUTPT VISIT, EST, LEVL IV, 30-39 MIN: ICD-10-PCS | Mod: S$GLB,,, | Performed by: STUDENT IN AN ORGANIZED HEALTH CARE EDUCATION/TRAINING PROGRAM

## 2022-06-17 PROCEDURE — 99999 PR PBB SHADOW E&M-EST. PATIENT-LVL III: ICD-10-PCS | Mod: PBBFAC,,, | Performed by: STUDENT IN AN ORGANIZED HEALTH CARE EDUCATION/TRAINING PROGRAM

## 2022-06-17 PROCEDURE — 1101F PT FALLS ASSESS-DOCD LE1/YR: CPT | Mod: CPTII,S$GLB,, | Performed by: STUDENT IN AN ORGANIZED HEALTH CARE EDUCATION/TRAINING PROGRAM

## 2022-06-17 PROCEDURE — 93279 PRGRMG DEV EVAL PM/LDLS PM: CPT

## 2022-06-17 PROCEDURE — 1126F AMNT PAIN NOTED NONE PRSNT: CPT | Mod: CPTII,S$GLB,, | Performed by: STUDENT IN AN ORGANIZED HEALTH CARE EDUCATION/TRAINING PROGRAM

## 2022-06-17 PROCEDURE — 1126F PR PAIN SEVERITY QUANTIFIED, NO PAIN PRESENT: ICD-10-PCS | Mod: CPTII,S$GLB,, | Performed by: STUDENT IN AN ORGANIZED HEALTH CARE EDUCATION/TRAINING PROGRAM

## 2022-06-17 PROCEDURE — 99214 OFFICE O/P EST MOD 30 MIN: CPT | Mod: S$GLB,,, | Performed by: STUDENT IN AN ORGANIZED HEALTH CARE EDUCATION/TRAINING PROGRAM

## 2022-06-17 PROCEDURE — 93010 ELECTROCARDIOGRAM REPORT: CPT | Mod: S$GLB,,, | Performed by: INTERNAL MEDICINE

## 2022-06-17 PROCEDURE — 93005 ELECTROCARDIOGRAM TRACING: CPT | Mod: S$GLB,,, | Performed by: STUDENT IN AN ORGANIZED HEALTH CARE EDUCATION/TRAINING PROGRAM

## 2022-06-17 PROCEDURE — 99999 PR PBB SHADOW E&M-EST. PATIENT-LVL III: CPT | Mod: PBBFAC,,, | Performed by: STUDENT IN AN ORGANIZED HEALTH CARE EDUCATION/TRAINING PROGRAM

## 2022-06-17 PROCEDURE — 93005 RHYTHM STRIP: ICD-10-PCS | Mod: S$GLB,,, | Performed by: STUDENT IN AN ORGANIZED HEALTH CARE EDUCATION/TRAINING PROGRAM

## 2022-06-17 PROCEDURE — 1101F PR PT FALLS ASSESS DOC 0-1 FALLS W/OUT INJ PAST YR: ICD-10-PCS | Mod: CPTII,S$GLB,, | Performed by: STUDENT IN AN ORGANIZED HEALTH CARE EDUCATION/TRAINING PROGRAM

## 2022-06-17 PROCEDURE — 3288F FALL RISK ASSESSMENT DOCD: CPT | Mod: CPTII,S$GLB,, | Performed by: STUDENT IN AN ORGANIZED HEALTH CARE EDUCATION/TRAINING PROGRAM

## 2022-06-17 PROCEDURE — 93010 RHYTHM STRIP: ICD-10-PCS | Mod: S$GLB,,, | Performed by: INTERNAL MEDICINE

## 2022-06-17 NOTE — PROGRESS NOTES
"Electrophysiology Clinic Note    Reason for follow-up patient visit: Ongoing evaluation and recommendations regarding symptomatic bradycardia with periods of complete heart block, s/p successful implantation of a Micra AV leadless pacemaker on 3/16/2022.     PRESENTING HISTORY:     History of Present Illness:  Mr. Jesus Matos Sr. is a rodo 89-year-old gentleman who returns to clinic today for ongoing evaluation and recommendations regarding symptomatic bradycardia with periods of complete heart block, s/p successful implantation of a Micra AV leadless pacemaker on 3/16/2022. He has a past medical history significant for symptomatic bradycardia with periods of complete heart block, Micra AV leadless pacemaker, coronary artery disease, COPD, and kyphosis.    He follows in general cardiology with Dr. Soliz and receives most of his care from [facility]. He was seen by Dr. Soliz on 2/28/2022 with complaints of slightly worsened shortness of breath, generalized fatigue, and transient episodes of lightheadedness. He was noted on an ECG at that time to have complete heart block and was referred for pacemaker implantation. Given his tortuous subclavian vasculature, a Micra AV leadless pacemaker was placed.     Mr. Matos presents today with his son. In discussion with Mr. Matos today, he tells me that he is feeling overall well, and feels "much better" since undergoing pacemaker implantation. He is no longer reporting periods of fatigue or lightheadedness. He denies any episodes of syncope/presyncope, chest pain or chest discomfort, palpitations, nausea or vomiting, orthopnea, or PND. He reports that he previously experienced mild lower extremity edema, but remains stable on his current diuretic dose with no further episodes of edema. He reports baseline mild shortness of breath and dyspnea with exertion, and feels that this is slightly worse than his baseline COPD. He ambulates with a walker or " in his wheelchair for farther distances. He cannot climb a flight of stairs secondary to osteoarthritis and kyphosis.     Review of Systems:  Review of Systems   Constitutional: Negative for activity change.   HENT: Positive for hearing loss. Negative for nasal congestion, nosebleeds, postnasal drip, rhinorrhea, sinus pressure/congestion, sneezing and sore throat.    Respiratory: Positive for shortness of breath. Negative for apnea, cough, chest tightness and wheezing.    Cardiovascular: Negative for chest pain, palpitations and leg swelling.   Gastrointestinal: Negative for abdominal distention, abdominal pain, blood in stool, change in bowel habit, constipation, diarrhea, nausea, vomiting and change in bowel habit.   Genitourinary: Negative for dysuria and hematuria.   Musculoskeletal: Positive for arthralgias, back pain and gait problem.   Neurological: Negative for dizziness, seizures, syncope, weakness, light-headedness, headaches, disturbances in coordination and coordination difficulties.        PAST HISTORY:     Past Medical History:  - Symptomatic bradycardia   - Periods of complete heart block, s/p Micra AV leadless pacemaker implantation  - Coronary artery disease  - COPD  - Kyphosis    Family History:  He denies any relevant cardiac history.    Social History:  He  reports that he has quit smoking. He quit after 20.00 years of use. He has never used smokeless tobacco. He reports current alcohol use. He reports that he does not use drugs.       MEDICATIONS & ALLERGIES:     Review of patient's allergies indicates:   Allergen Reactions    Opioids - morphine analogues Anaphylaxis     Other reaction(s): Low blood pressure, SHOCK, UNSPECIFIED    Penicillins Hives     Other reaction(s): nausa    Aspirin     Nsaids (non-steroidal anti-inflammatory drug)      Other reaction(s): Other (See Comments)    Tramadol Hallucinations       Current Outpatient Medications on File Prior to Visit   Medication Sig  "Dispense Refill    acetaminophen (TYLENOL) 500 MG tablet Take 500 mg by mouth every 6 (six) hours as needed for Pain.      atorvastatin (LIPITOR) 80 MG tablet Take 1 tablet (80 mg total) by mouth every evening. 90 tablet 3    bimatoprost (LUMIGAN) 0.01 % Drop Place 1 drop into both eyes every evening.      brimonidine 0.2% (ALPHAGAN) 0.2 % Drop Place 1 drop into both eyes 2 (two) times daily.      calcitonin,salmon,synthetic (CALCITONIN, SALMON, NASL) 1 spray by Each Nostril route Daily.      calcium carbonate/vitamin D3 (CALCIUM 500 + D, D3, ORAL) Take by mouth Daily.      clopidogreL (PLAVIX) 75 mg tablet TAKE 1 TABLET EVERY DAY 90 tablet 3    cyanocobalamin, vitamin B-12, 5,000 mcg Subl Place 1,200 mcg under the tongue Daily.      dorzolamide (TRUSOPT) 2 % ophthalmic solution Place 1 drop into both eyes Daily.      ferrous gluconate (FERGON) 324 MG tablet Take 324 mg by mouth daily with breakfast.      finasteride (PROSCAR) 5 mg tablet Take 5 mg by mouth once daily.      fluticasone-umeclidin-vilanter (TRELEGY ELLIPTA) 100-62.5-25 mcg DsDv Inhale 1 puff into the lungs once daily.      furosemide (LASIX) 40 MG tablet Take 1 tablet (40 mg total) by mouth 3 (three) times a week. 36 tablet 3    levocetirizine (XYZAL) 5 MG tablet Take by mouth once daily.      multivitamin (THERAGRAN) per tablet Take by mouth once daily.      tamsulosin (FLOMAX) 0.4 mg Cap Take 0.4 mg by mouth once daily.      vit C/E/Zn/coppr/lutein/zeaxan (PRESERVISION AREDS-2 ORAL) Take by mouth Daily.       No current facility-administered medications on file prior to visit.        OBJECTIVE:     Vital Signs:  /62   Pulse 68   Ht 5' 4" (1.626 m)   Wt 58.3 kg (128 lb 8.5 oz)   BMI 22.06 kg/m²     Physical Exam:  Physical Exam  Constitutional:       General: He is not in acute distress.     Appearance: Normal appearance. He is not ill-appearing or diaphoretic.      Comments: Well-appearing elderly man in NAD presenting in " a wheelchair.   HENT:      Head: Normocephalic and atraumatic.      Comments: Mask worn in clinic in the setting of COVID precautions.   Eyes:      Pupils: Pupils are equal, round, and reactive to light.   Cardiovascular:      Rate and Rhythm: Normal rate and regular rhythm.      Pulses: Normal pulses.      Heart sounds: Normal heart sounds. No murmur heard.    No friction rub. No gallop.   Pulmonary:      Effort: Pulmonary effort is normal. No respiratory distress.      Breath sounds: Normal breath sounds. No wheezing, rhonchi or rales.   Chest:      Chest wall: No tenderness.   Abdominal:      General: There is no distension.      Palpations: Abdomen is soft.      Tenderness: There is no abdominal tenderness.   Musculoskeletal:         General: No swelling or tenderness.      Cervical back: Normal range of motion.      Right lower leg: No edema.      Left lower leg: No edema.   Skin:     General: Skin is warm and dry.      Findings: No erythema, lesion or rash.   Neurological:      General: No focal deficit present.      Mental Status: He is alert and oriented to person, place, and time. Mental status is at baseline.      Motor: No weakness.      Gait: Gait normal.   Psychiatric:         Mood and Affect: Mood normal.         Behavior: Behavior normal.        Laboratory Data:  Lab Results   Component Value Date    WBC 8.9 03/14/2022    HGB 12.8 (L) 03/14/2022    HCT 39.1 (L) 03/14/2022    MCV 92.3 03/14/2022     03/14/2022     Lab Results   Component Value Date     03/16/2022     03/16/2022    K 4.3 03/16/2022     03/16/2022    CO2 27 03/16/2022    BUN 36 (H) 03/16/2022    CREATININE 1.0 03/16/2022    CALCIUM 9.2 03/16/2022     Lab Results   Component Value Date    INR 1.1 03/14/2022    INR 1.0 02/09/2010       Pertinent Cardiac Data:  ECG: Atrially-sensed, ventricularly-paced rhythm with rate of 68 bpm,  ms, paced QRSd 156 ms, QT/QTc 436/463 ms, occasional PVCs.     Resting 2D  Transthoracic Echocardiogram - 2/28/2022:  · The left ventricle is normal in size with low normal systolic function.  · The estimated ejection fraction is 50%.  · Grade I left ventricular diastolic dysfunction.  · Moderate left atrial enlargement.  · The estimated PA systolic pressure is 75 mmHg.  · Normal right ventricular size with normal right ventricular systolic function.  · There is moderate pulmonary hypertension.  · Normal central venous pressure (3 mmHg).  · Moderate right atrial enlargement.  · Mild mitral regurgitation.  · Mild tricuspid regurgitation.  · Mild aortic regurgitation.    Device Interrogation: Personal review of his device interrogation report ([employer] Micra AV leadless pacemaker, implanted 3/16/2022) reveals adequate battery longevity with an estimated >8 years of battery life remaining. He has underlying sinus rhythm with complete heart block, with an escape in the 40s. His sensing, threshold, and impedance parameters remain stable. He is AM- 48.3%, VS 36.4%, and  15.3%. There are no concerning AHR or VHR episodes noted.        ASSESSMENT & PLAN:   Mr. Jesus Matos  is a rodo 89-year-old gentleman who returns to clinic today for ongoing evaluation and recommendations regarding symptomatic bradycardia with periods of complete heart block, s/p successful implantation of a Micra AV leadless pacemaker on 3/16/2022. He has a past medical history significant for symptomatic bradycardia with periods of complete heart block, Micra AV leadless pacemaker, coronary artery disease, COPD, and kyphosis.    - He has a normally functioning Micra AV leadless pacemaker on device interrogation today. He will continue to send remote transmissions, with repeat in-office interrogation in six months.   - While he is pacing frequently from the RV, he continues to have approximately 34.3% intrinsic AV conduction. His paced QRSd is 156ms as compared to his intrinsic RBBB QRSd of 148ms. He  continues to have preserved systolic function on echocardiogram, LVEF 50%.      This patient will return to clinic with me in six months with continued remote transmissions, PCP, and general cardiology appointments in the interim. All questions and concerns were addressed at this encounter.     Signing Physician:       JORGE L Garcia MD  Electrophysiology Attending

## 2022-07-18 ENCOUNTER — PATIENT MESSAGE (OUTPATIENT)
Dept: ELECTROPHYSIOLOGY | Facility: CLINIC | Age: 87
End: 2022-07-18
Payer: MEDICARE

## 2022-07-19 ENCOUNTER — TELEPHONE (OUTPATIENT)
Dept: ELECTROPHYSIOLOGY | Facility: CLINIC | Age: 87
End: 2022-07-19
Payer: MEDICARE

## 2022-07-19 NOTE — TELEPHONE ENCOUNTER
Pt was due for a scheduled remote transmission on 7/18/22.  However, a message was received via the pt portal stating the pt is currently Inpatient @ [facility] with a fractured hip after a fall. Note put in the [employer] [facility] link remote web site reflecting this as well.

## 2022-08-02 ENCOUNTER — TELEPHONE (OUTPATIENT)
Dept: CARDIOLOGY | Facility: HOSPITAL | Age: 87
End: 2022-08-02
Payer: MEDICARE

## 2022-08-02 NOTE — TELEPHONE ENCOUNTER
Returned patients call, no answer.  Unable to leave a message.   ----- Message from Alexander Ramirez sent at 8/2/2022  1:00 PM CDT -----  Daughter of patient calling to make sure that if reading was received. Home monitor was brought to nursing facility. Please call Sofiya on mobile number.

## 2022-08-24 ENCOUNTER — PATIENT MESSAGE (OUTPATIENT)
Dept: ELECTROPHYSIOLOGY | Facility: CLINIC | Age: 87
End: 2022-08-24
Payer: MEDICARE

## 2022-11-02 NOTE — TELEPHONE ENCOUNTER
Called patient to reschedule his appointment with Dr. Garcia from 12/19/22. I spoke with the patients daughter and she agreed to appointments on 11/14/22.

## 2022-11-03 NOTE — TELEPHONE ENCOUNTER
Called and spoke to patients daughter to reschedule appointments with Dr. Garcia from 11/14/22. Patients daughter agreed to appointment on 11/15/22.

## 2022-11-14 NOTE — PROGRESS NOTES
"Electrophysiology Clinic Note    Reason for follow-up patient visit: Ongoing evaluation and recommendations regarding symptomatic bradycardia with periods of complete heart block, s/p successful implantation of a Micra AV leadless pacemaker on 3/16/2022.     PRESENTING HISTORY:     History of Present Illness:  Mr. Jesus Matos Sr. is a rodo 89-year-old gentleman who returns to clinic today for ongoing evaluation and recommendations regarding symptomatic bradycardia with periods of complete heart block, s/p successful implantation of a Micra AV leadless pacemaker on 3/16/2022. He has a past medical history significant for symptomatic bradycardia with periods of complete heart block, Micra AV leadless pacemaker, coronary artery disease, COPD, and kyphosis. He recently suffered a fall with a fracture of his right femur, s/p open reduction with internal fixation with Dr. Mayo on 7/19/2022.     He follows in general cardiology with Dr. Soliz and receives most of his care from [facility]. He was previously seen by Dr. Soliz on 4/21/2022. In review of his previous EP history, he was seen earlier this year on 2/28/2022 with complaints of slightly worsened shortness of breath, generalized fatigue, and transient episodes of lightheadedness. He was noted on an ECG at that time to have complete heart block and was referred for pacemaker implantation. Given his tortuous subclavian vasculature, a Micra AV leadless pacemaker was successfully placed 3/16/2022.     Mr. Matos presents today with his daughter. In discussion with Mr. Matos and his daughter today, he tells me that he is feeling overall well, and feels "much better" since undergoing pacemaker implantation. His primary complaint is that following his recent fall with a hip fracture, he has been told not to ambulate without assistance. He continues to work with PT for improved mobility. He is no longer reporting periods of lightheadedness. " He denies any episodes of syncope/presyncope, chest pain or chest discomfort, palpitations, nausea or vomiting, orthopnea, or PND. He reports that he previously experienced mild lower extremity edema, but remains stable on his current diuretic dose with no further episodes of edema. He reports baseline mild shortness of breath and dyspnea with exertion, and feels that this is slightly worse than his baseline COPD. He is now ambulating with either his rolling walker with assistance or his wheelchair. He cannot climb a flight of stairs secondary to osteoarthritis and kyphosis and his recent right hip fracture.     Review of Systems:  Review of Systems   Constitutional:  Negative for activity change.   HENT:  Positive for hearing loss. Negative for nasal congestion, nosebleeds, postnasal drip, rhinorrhea, sinus pressure/congestion, sneezing and sore throat.    Respiratory:  Positive for shortness of breath. Negative for apnea, cough, chest tightness and wheezing.    Cardiovascular:  Negative for chest pain, palpitations and leg swelling.   Gastrointestinal:  Negative for abdominal distention, abdominal pain, blood in stool, change in bowel habit, constipation, diarrhea, nausea, vomiting and change in bowel habit.   Genitourinary:  Negative for dysuria and hematuria.   Musculoskeletal:  Positive for arthralgias, back pain and gait problem.   Neurological:  Negative for dizziness, seizures, syncope, weakness, light-headedness, headaches, coordination difficulties and coordination difficulties.      PAST HISTORY:     Past Medical History:  - Symptomatic bradycardia   - Periods of complete heart block, s/p Micra AV leadless pacemaker implantation  - Coronary artery disease  - COPD  - Kyphosis  - Recent mechanical fall with a fracture of the right femur, s/p ORIF 7/19/2022    Family History:  He denies any relevant cardiac history.    Social History:  He  reports that he has quit smoking. He has never used smokeless  tobacco. He reports current alcohol use. He reports that he does not use drugs.       MEDICATIONS & ALLERGIES:     Review of patient's allergies indicates:   Allergen Reactions    Opioids - morphine analogues Anaphylaxis     Other reaction(s): Low blood pressure, SHOCK, UNSPECIFIED    Penicillins Hives     Other reaction(s): nausa    Aspirin     Nsaids (non-steroidal anti-inflammatory drug)      Other reaction(s): Other (See Comments)    Tramadol Hallucinations       Current Outpatient Medications on File Prior to Visit   Medication Sig Dispense Refill    acetaminophen (TYLENOL) 500 MG tablet Take 500 mg by mouth every 6 (six) hours as needed for Pain.      atorvastatin (LIPITOR) 80 MG tablet Take 1 tablet (80 mg total) by mouth every evening. 90 tablet 3    bimatoprost (LUMIGAN) 0.01 % Drop Place 1 drop into both eyes every evening.      brimonidine 0.2% (ALPHAGAN) 0.2 % Drop Place 1 drop into both eyes 2 (two) times daily.      calcitonin,salmon,synthetic (CALCITONIN, SALMON, NASL) 1 spray by Each Nostril route Daily.      calcium carbonate/vitamin D3 (CALCIUM 500 + D, D3, ORAL) Take by mouth Daily.      clopidogreL (PLAVIX) 75 mg tablet TAKE 1 TABLET EVERY DAY 90 tablet 3    cyanocobalamin, vitamin B-12, 5,000 mcg Subl Place 1,200 mcg under the tongue Daily.      dorzolamide (TRUSOPT) 2 % ophthalmic solution Place 1 drop into both eyes Daily.      ferrous gluconate (FERGON) 324 MG tablet Take 324 mg by mouth daily with breakfast.      finasteride (PROSCAR) 5 mg tablet Take 5 mg by mouth once daily.      fluticasone-umeclidin-vilanter (TRELEGY ELLIPTA) 100-62.5-25 mcg DsDv Inhale 1 puff into the lungs once daily.      furosemide (LASIX) 40 MG tablet Take 1 tablet (40 mg total) by mouth 3 (three) times a week. 36 tablet 3    levocetirizine (XYZAL) 5 MG tablet Take by mouth once daily.      multivitamin (THERAGRAN) per tablet Take by mouth once daily.      tamsulosin (FLOMAX) 0.4 mg Cap Take 0.4 mg by mouth once  "daily.      vit C/E/Zn/coppr/lutein/zeaxan (PRESERVISION AREDS-2 ORAL) Take by mouth Daily.       No current facility-administered medications on file prior to visit.        OBJECTIVE:     Vital Signs:  /64 (BP Location: Right arm, Patient Position: Sitting, BP Method: Medium (Manual))   Pulse 73   Ht 5' 4" (1.626 m)   Wt 63.5 kg (139 lb 15.9 oz)   BMI 24.03 kg/m²     Physical Exam:  Physical Exam  Constitutional:       General: He is not in acute distress.     Appearance: Normal appearance. He is not ill-appearing or diaphoretic.      Comments: Well-appearing elderly man in NAD presenting in a wheelchair.   HENT:      Head: Normocephalic and atraumatic.      Comments: Mask worn in clinic in the setting of COVID precautions.   Eyes:      Pupils: Pupils are equal, round, and reactive to light.   Cardiovascular:      Rate and Rhythm: Normal rate and regular rhythm.      Pulses: Normal pulses.      Heart sounds: Normal heart sounds. No murmur heard.    No friction rub. No gallop.   Pulmonary:      Effort: Pulmonary effort is normal. No respiratory distress.      Breath sounds: Normal breath sounds. No wheezing, rhonchi or rales.   Chest:      Chest wall: No tenderness.   Abdominal:      General: There is no distension.      Palpations: Abdomen is soft.      Tenderness: There is no abdominal tenderness.   Musculoskeletal:         General: No swelling or tenderness.      Cervical back: Normal range of motion.      Right lower leg: No edema.      Left lower leg: No edema.   Skin:     General: Skin is warm and dry.      Findings: No erythema, lesion or rash.   Neurological:      General: No focal deficit present.      Mental Status: He is alert and oriented to person, place, and time. Mental status is at baseline.      Motor: No weakness.      Gait: Gait normal.   Psychiatric:         Mood and Affect: Mood normal.         Behavior: Behavior normal.      Laboratory Data:  Lab Results   Component Value Date    WBC " 8.9 03/14/2022    HGB 12.8 (L) 03/14/2022    HCT 39.1 (L) 03/14/2022    MCV 92.3 03/14/2022     03/14/2022     Lab Results   Component Value Date     03/16/2022     03/16/2022    K 4.3 03/16/2022     03/16/2022    CO2 27 03/16/2022    BUN 36 (H) 03/16/2022    CREATININE 1.0 03/16/2022    CALCIUM 9.2 03/16/2022     Lab Results   Component Value Date    INR 1.1 03/14/2022    INR 1.0 02/09/2010       Pertinent Cardiac Data:  ECG: Atrially-sensed, ventricularly-paced rhythm with rate of 73 bpm,  ms, paced QRSd 148 ms, QT/QTc 430/473 ms.    Resting 2D Transthoracic Echocardiogram - 2/28/2022:  The left ventricle is normal in size with low normal systolic function.  The estimated ejection fraction is 50%.  Grade I left ventricular diastolic dysfunction.  Moderate left atrial enlargement.  The estimated PA systolic pressure is 75 mmHg.  Normal right ventricular size with normal right ventricular systolic function.  There is moderate pulmonary hypertension.  Normal central venous pressure (3 mmHg).  Moderate right atrial enlargement.  Mild mitral regurgitation.  Mild tricuspid regurgitation.  Mild aortic regurgitation.    Device Interrogation: Personal review of his device interrogation report ([employer] Micra AV leadless pacemaker, implanted 3/16/2022) reveals adequate battery longevity with an estimated >8 years of battery life remaining. He has underlying sinus rhythm with complete heart block, with an escape in the 40s. His sensing, threshold, and impedance parameters remain stable. He is AM- 78.4%, VS 1.8%, and  19.8%. There are no concerning AHR or VHR episodes noted.        ASSESSMENT & PLAN:   Mr. Jesus Matos . is a rodo 89-year-old gentleman who returns to clinic today for ongoing evaluation and recommendations regarding symptomatic bradycardia with periods of complete heart block, s/p successful implantation of a Micra AV leadless pacemaker on 3/16/2022. He has a  past medical history significant for symptomatic bradycardia with periods of complete heart block, Micra AV leadless pacemaker, coronary artery disease, COPD, and kyphosis. He recently suffered a fall with a fracture of his right femur, s/p open reduction with internal fixation with Dr. Mayo on 7/19/2022.     - He has a normally functioning Micra AV leadless pacemaker on device interrogation today. He will continue to send remote transmissions, with repeat in-office interrogation in six months.   - He is predominately paced from the RV, with a total RV pacing burden of 98.2%. His paced QRSd is 148ms, with his intrinsic RBBB QRSd of 148ms. He continues to have preserved systolic function on echocardiogram, LVEF 50%.      This patient will return to clinic with me in six months with continued remote transmissions, PCP, and general cardiology appointments in the interim. All questions and concerns were addressed at this encounter.     Signing Physician:       JORGE L Garcia MD  Electrophysiology Attending

## 2022-11-20 PROBLEM — S72.001D HIP FRACTURE REQUIRING OPERATIVE REPAIR, RIGHT, CLOSED, WITH ROUTINE HEALING, SUBSEQUENT ENCOUNTER: Status: ACTIVE | Noted: 2022-01-01

## 2023-01-01 ENCOUNTER — OFFICE VISIT (OUTPATIENT)
Dept: ELECTROPHYSIOLOGY | Facility: CLINIC | Age: 88
End: 2023-01-01
Attending: STUDENT IN AN ORGANIZED HEALTH CARE EDUCATION/TRAINING PROGRAM
Payer: MEDICARE

## 2023-01-01 ENCOUNTER — CLINICAL SUPPORT (OUTPATIENT)
Dept: CARDIOLOGY | Facility: HOSPITAL | Age: 88
End: 2023-01-01
Payer: MEDICARE

## 2023-01-01 ENCOUNTER — PATIENT MESSAGE (OUTPATIENT)
Dept: CARDIOLOGY | Facility: CLINIC | Age: 88
End: 2023-01-01
Payer: MEDICARE

## 2023-01-01 ENCOUNTER — CLINICAL SUPPORT (OUTPATIENT)
Dept: CARDIOLOGY | Facility: HOSPITAL | Age: 88
End: 2023-01-01
Attending: STUDENT IN AN ORGANIZED HEALTH CARE EDUCATION/TRAINING PROGRAM
Payer: MEDICARE

## 2023-01-01 ENCOUNTER — PATIENT MESSAGE (OUTPATIENT)
Dept: ELECTROPHYSIOLOGY | Facility: CLINIC | Age: 88
End: 2023-01-01
Payer: MEDICARE

## 2023-01-01 ENCOUNTER — OFFICE VISIT (OUTPATIENT)
Dept: CARDIOLOGY | Facility: CLINIC | Age: 88
End: 2023-01-01
Payer: MEDICARE

## 2023-01-01 ENCOUNTER — PATIENT MESSAGE (OUTPATIENT)
Dept: ELECTROPHYSIOLOGY | Facility: CLINIC | Age: 88
End: 2023-01-01

## 2023-01-01 VITALS
SYSTOLIC BLOOD PRESSURE: 115 MMHG | HEIGHT: 64 IN | DIASTOLIC BLOOD PRESSURE: 57 MMHG | WEIGHT: 141.31 LBS | HEART RATE: 76 BPM | BODY MASS INDEX: 24.13 KG/M2

## 2023-01-01 DIAGNOSIS — Z79.01 CHRONIC ANTICOAGULATION: ICD-10-CM

## 2023-01-01 DIAGNOSIS — Z95.5 STATUS POST CORONARY ARTERY STENT PLACEMENT: ICD-10-CM

## 2023-01-01 DIAGNOSIS — I44.2 COMPLETE HEART BLOCK: Primary | ICD-10-CM

## 2023-01-01 DIAGNOSIS — S72.001D HIP FRACTURE REQUIRING OPERATIVE REPAIR, RIGHT, CLOSED, WITH ROUTINE HEALING, SUBSEQUENT ENCOUNTER: ICD-10-CM

## 2023-01-01 DIAGNOSIS — R00.1 SYMPTOMATIC BRADYCARDIA: ICD-10-CM

## 2023-01-01 DIAGNOSIS — Z95.0 PRESENCE OF CARDIAC PACEMAKER: ICD-10-CM

## 2023-01-01 DIAGNOSIS — Z95.0 STATUS POST PLACEMENT OF LEADLESS CARDIAC PACEMAKER: ICD-10-CM

## 2023-01-01 DIAGNOSIS — C61 CARCINOMA OF PROSTATE: ICD-10-CM

## 2023-01-01 DIAGNOSIS — K21.9 GASTROESOPHAGEAL REFLUX DISEASE, UNSPECIFIED WHETHER ESOPHAGITIS PRESENT: ICD-10-CM

## 2023-01-01 DIAGNOSIS — Z95.0 PACEMAKER: ICD-10-CM

## 2023-01-01 DIAGNOSIS — J44.9 CHRONIC OBSTRUCTIVE PULMONARY DISEASE, UNSPECIFIED COPD TYPE: ICD-10-CM

## 2023-01-01 DIAGNOSIS — Z86.711 HISTORY OF PULMONARY EMBOLISM: ICD-10-CM

## 2023-01-01 DIAGNOSIS — I25.10 CORONARY ARTERY DISEASE INVOLVING NATIVE CORONARY ARTERY OF NATIVE HEART WITHOUT ANGINA PECTORIS: ICD-10-CM

## 2023-01-01 DIAGNOSIS — I50.32 CHRONIC DIASTOLIC CONGESTIVE HEART FAILURE: Primary | Chronic | ICD-10-CM

## 2023-01-01 DIAGNOSIS — Z95.0 STATUS POST PLACEMENT OF LEADLESS CARDIAC PACEMAKER: Chronic | ICD-10-CM

## 2023-01-01 DIAGNOSIS — E78.2 MIXED HYPERLIPIDEMIA: Chronic | ICD-10-CM

## 2023-01-01 DIAGNOSIS — M40.209 KYPHOSIS, UNSPECIFIED KYPHOSIS TYPE, UNSPECIFIED SPINAL REGION: ICD-10-CM

## 2023-01-01 DIAGNOSIS — Z95.5 STATUS POST CORONARY ARTERY STENT PLACEMENT: Chronic | ICD-10-CM

## 2023-01-01 DIAGNOSIS — R00.1 SYMPTOMATIC BRADYCARDIA: Chronic | ICD-10-CM

## 2023-01-01 DIAGNOSIS — I25.10 ARTERIOSCLEROSIS OF CORONARY ARTERY: ICD-10-CM

## 2023-01-01 DIAGNOSIS — E78.2 MIXED HYPERLIPIDEMIA: ICD-10-CM

## 2023-01-01 DIAGNOSIS — I50.32 CHRONIC DIASTOLIC CONGESTIVE HEART FAILURE: ICD-10-CM

## 2023-01-01 DIAGNOSIS — I50.32 CHRONIC DIASTOLIC CONGESTIVE HEART FAILURE: Chronic | ICD-10-CM

## 2023-01-01 DIAGNOSIS — I26.99 PULMONARY EMBOLISM, UNSPECIFIED CHRONICITY, UNSPECIFIED PULMONARY EMBOLISM TYPE, UNSPECIFIED WHETHER ACUTE COR PULMONALE PRESENT: Primary | ICD-10-CM

## 2023-01-01 DIAGNOSIS — I71.40 ABDOMINAL AORTIC ANEURYSM (AAA) WITHOUT RUPTURE, UNSPECIFIED PART: ICD-10-CM

## 2023-01-01 DIAGNOSIS — I44.2 COMPLETE HEART BLOCK: ICD-10-CM

## 2023-01-01 PROCEDURE — 99999 PR PBB SHADOW E&M-EST. PATIENT-LVL III: ICD-10-PCS | Mod: PBBFAC,,, | Performed by: INTERNAL MEDICINE

## 2023-01-01 PROCEDURE — 1159F PR MEDICATION LIST DOCUMENTED IN MEDICAL RECORD: ICD-10-PCS | Mod: CPTII,S$GLB,, | Performed by: INTERNAL MEDICINE

## 2023-01-01 PROCEDURE — 93010 RHYTHM STRIP: ICD-10-PCS | Mod: S$GLB,,, | Performed by: INTERNAL MEDICINE

## 2023-01-01 PROCEDURE — 93294 CARDIAC DEVICE CHECK - REMOTE: ICD-10-PCS | Mod: ,,, | Performed by: STUDENT IN AN ORGANIZED HEALTH CARE EDUCATION/TRAINING PROGRAM

## 2023-01-01 PROCEDURE — 1159F MED LIST DOCD IN RCRD: CPT | Mod: CPTII,S$GLB,, | Performed by: INTERNAL MEDICINE

## 2023-01-01 PROCEDURE — 1100F PR PT FALLS ASSESS DOC 2+ FALLS/FALL W/INJURY/YR: ICD-10-PCS | Mod: CPTII,S$GLB,, | Performed by: INTERNAL MEDICINE

## 2023-01-01 PROCEDURE — 1101F PT FALLS ASSESS-DOCD LE1/YR: CPT | Mod: CPTII,S$GLB,, | Performed by: STUDENT IN AN ORGANIZED HEALTH CARE EDUCATION/TRAINING PROGRAM

## 2023-01-01 PROCEDURE — 99214 OFFICE O/P EST MOD 30 MIN: CPT | Mod: S$GLB,,, | Performed by: INTERNAL MEDICINE

## 2023-01-01 PROCEDURE — 93296 REM INTERROG EVL PM/IDS: CPT | Performed by: STUDENT IN AN ORGANIZED HEALTH CARE EDUCATION/TRAINING PROGRAM

## 2023-01-01 PROCEDURE — 93279 CARDIAC DEVICE CHECK - IN CLINIC & HOSPITAL: ICD-10-PCS | Mod: 26,,, | Performed by: STUDENT IN AN ORGANIZED HEALTH CARE EDUCATION/TRAINING PROGRAM

## 2023-01-01 PROCEDURE — 3288F FALL RISK ASSESSMENT DOCD: CPT | Mod: CPTII,S$GLB,, | Performed by: STUDENT IN AN ORGANIZED HEALTH CARE EDUCATION/TRAINING PROGRAM

## 2023-01-01 PROCEDURE — 93279 PRGRMG DEV EVAL PM/LDLS PM: CPT

## 2023-01-01 PROCEDURE — 3288F PR FALLS RISK ASSESSMENT DOCUMENTED: ICD-10-PCS | Mod: CPTII,S$GLB,, | Performed by: INTERNAL MEDICINE

## 2023-01-01 PROCEDURE — 1126F AMNT PAIN NOTED NONE PRSNT: CPT | Mod: CPTII,S$GLB,, | Performed by: INTERNAL MEDICINE

## 2023-01-01 PROCEDURE — 93279 PRGRMG DEV EVAL PM/LDLS PM: CPT | Mod: 26,,, | Performed by: STUDENT IN AN ORGANIZED HEALTH CARE EDUCATION/TRAINING PROGRAM

## 2023-01-01 PROCEDURE — 1100F PTFALLS ASSESS-DOCD GE2>/YR: CPT | Mod: CPTII,S$GLB,, | Performed by: INTERNAL MEDICINE

## 2023-01-01 PROCEDURE — 1101F PR PT FALLS ASSESS DOC 0-1 FALLS W/OUT INJ PAST YR: ICD-10-PCS | Mod: CPTII,S$GLB,, | Performed by: STUDENT IN AN ORGANIZED HEALTH CARE EDUCATION/TRAINING PROGRAM

## 2023-01-01 PROCEDURE — 93005 RHYTHM STRIP: ICD-10-PCS | Mod: S$GLB,,, | Performed by: STUDENT IN AN ORGANIZED HEALTH CARE EDUCATION/TRAINING PROGRAM

## 2023-01-01 PROCEDURE — 99214 OFFICE O/P EST MOD 30 MIN: CPT | Mod: S$GLB,,, | Performed by: STUDENT IN AN ORGANIZED HEALTH CARE EDUCATION/TRAINING PROGRAM

## 2023-01-01 PROCEDURE — 1126F PR PAIN SEVERITY QUANTIFIED, NO PAIN PRESENT: ICD-10-PCS | Mod: CPTII,S$GLB,, | Performed by: INTERNAL MEDICINE

## 2023-01-01 PROCEDURE — 3288F PR FALLS RISK ASSESSMENT DOCUMENTED: ICD-10-PCS | Mod: CPTII,S$GLB,, | Performed by: STUDENT IN AN ORGANIZED HEALTH CARE EDUCATION/TRAINING PROGRAM

## 2023-01-01 PROCEDURE — 99214 PR OFFICE/OUTPT VISIT, EST, LEVL IV, 30-39 MIN: ICD-10-PCS | Mod: S$GLB,,, | Performed by: INTERNAL MEDICINE

## 2023-01-01 PROCEDURE — 93010 ELECTROCARDIOGRAM REPORT: CPT | Mod: S$GLB,,, | Performed by: INTERNAL MEDICINE

## 2023-01-01 PROCEDURE — 93294 REM INTERROG EVL PM/LDLS PM: CPT | Mod: ,,, | Performed by: STUDENT IN AN ORGANIZED HEALTH CARE EDUCATION/TRAINING PROGRAM

## 2023-01-01 PROCEDURE — 3288F FALL RISK ASSESSMENT DOCD: CPT | Mod: CPTII,S$GLB,, | Performed by: INTERNAL MEDICINE

## 2023-01-01 PROCEDURE — 99214 PR OFFICE/OUTPT VISIT, EST, LEVL IV, 30-39 MIN: ICD-10-PCS | Mod: S$GLB,,, | Performed by: STUDENT IN AN ORGANIZED HEALTH CARE EDUCATION/TRAINING PROGRAM

## 2023-01-01 PROCEDURE — 99999 PR PBB SHADOW E&M-EST. PATIENT-LVL III: CPT | Mod: PBBFAC,,, | Performed by: INTERNAL MEDICINE

## 2023-01-01 PROCEDURE — 93005 ELECTROCARDIOGRAM TRACING: CPT | Mod: S$GLB,,, | Performed by: STUDENT IN AN ORGANIZED HEALTH CARE EDUCATION/TRAINING PROGRAM

## 2023-01-01 RX ORDER — ASPIRIN 81 MG/1
81 TABLET ORAL
COMMUNITY
Start: 2022-01-01 | End: 2023-01-01

## 2023-01-01 RX ORDER — HYDROGEN PEROXIDE 3 %
20 SOLUTION, NON-ORAL MISCELLANEOUS EVERY MORNING
COMMUNITY
Start: 2023-01-01

## 2023-01-01 RX ORDER — FUROSEMIDE 20 MG/1
TABLET ORAL
Qty: 30 TABLET | Refills: 11 | Status: SHIPPED | OUTPATIENT
Start: 2023-01-01 | End: 2023-01-01 | Stop reason: SDUPTHER

## 2023-01-01 RX ORDER — ATORVASTATIN CALCIUM 80 MG/1
TABLET, FILM COATED ORAL
Qty: 90 TABLET | Refills: 1 | Status: SHIPPED | OUTPATIENT
Start: 2023-01-01

## 2023-02-01 PROBLEM — I21.4 NON-STEMI (NON-ST ELEVATED MYOCARDIAL INFARCTION): Status: RESOLVED | Noted: 2022-01-01 | Resolved: 2023-01-01

## 2023-02-01 PROBLEM — I26.99 PULMONARY EMBOLISM: Status: ACTIVE | Noted: 2022-01-01

## 2023-02-01 PROBLEM — I21.4 NON-STEMI (NON-ST ELEVATED MYOCARDIAL INFARCTION): Status: ACTIVE | Noted: 2022-01-01

## 2023-02-01 NOTE — PROGRESS NOTES
Subjective:   02/01/2023     Patient ID:  Jesus Matos Sr. is a 89 y.o. male who presents for [facility] Follow Up      Follow-up after admission at  for large PE.  He is done well.  He has a follow-up appointment tomorrow with pulmonary.  He does have dyspnea on exertion.  Mild lower extremity edema is present.  He is allergic to aspirin.  He is currently off of clopidogrel.  Only on Eliquis 5 mg twice a day.    He had complete AV block, status post micro pacemaker.  Function normal.      He does have coronary artery disease status post coronary artery bypass grafting.    Hypercholesterolemia is present, on high-dose statin therapy      Recent admission to  for large PE:  This is a 89 year-old male, resident of [facility], with past medical history of CAD, CABG, defibrillator, dysphagia/odynophagia with Botox injection, prostate cancer, COPD, and hip fracture with operative repair July 2022, who presented on 12/09/2022 with syncopal episode x2. In the ED, he was found to have an elevated troponin as well as an elevated D-dimer. CTA showed submassive pulmonary embolism with right heart strain and he was started on Lovenox. He had no volume issues. He was also seen by Cardiology for possible NSTEMI however his elevated troponin was most likely concurrent with pulmonary embolism versus ACS. He tolerated his anticoagulation well. He remains without hypoxia. He had no further episodes of syncope. He is medically stable to return to his [facility] with [facility] health.    1. Pulmonary embolus- submassive per CTA. Started on Lovenox, now transitioned to Eliquis, likely life time as he has a history of prostate cancer.  2. Coronary artery disease- hx CABG. With elevated troponin, most consistent with PE versus AMI. Stop Plavix with addition of Eliquis, unable to add aspirin with allergy. No reports of chest pain or shortness of breath. Continue beta-blocker.  3. Advanced age-noted.   4.  COPD-no acute issues, productive cough. No fever.  5. History dysphagia-with productive cough. Chest x-ray unremarkable.      Patient here today for evaluation of abdominal pain and weakness.  The abdominal pain radiates from his left ischial tuberosity across the mid abdomen.  A recent abdominal x-ray showed the presence of a 3.5 cm abdominal aortic aneurysm.  They were concerned this could be causing his pain.  Pain appears to be better lying flat, worse sitting up.  It is positional while sitting up also.     He did have complete heart block and is status post leadless pacemaker insertion.  He has done well subsequent to that.  He has not had any swelling.     He has previously had diastolic heart failure, some of this may have been related to bradycardia.  He continues take furosemide 40 mg daily and has not had swelling.  His blood pressure today is low, 94/52.           Prior note:  Former patient of mine from  with coronary artery disease who presents with increasing shortness of breath.  He was found have an elevated BNP recently and was edematous.  He is now on Lasix 40 mg daily and notes a decrease in swelling and possibly shortness of breath.  He does not have a history of heart failure previously.    He is also noted to be bradycardic.     Recent laboratory work:  7/20/21 shows hemoglobin 11.7, glucose 110, BUN 23, GF for 1 0 for, liver function tests normal.  The TSH is 2.58.  The total cholesterol is 94, triglycerides 50, HDL 44, LDL 42.     01/18/2022 shows hemoglobin 11.5, BUN 25, GFR 99, potassium 3.9, TSH 2.69, glycosylated hemoglobin 5.5, iron saturation 43%,  Total cholesterol 93, triglycerides 72, HDL 44, LDL 37.      Echocardiography performed by me in the office shows overall left ventricular systolic function to be low normal.     EKG showed intermittent complete heart block.  He was referred to EP.  A micro pacemaker was implanted.     Complete heart block  s/p Micra leadless PPM      Plan:  - Doxycycline 100 mg BID x 5 days for surgical prophylaxis  - Resume PTA medications   - Follow up in device clinic in 1 week (3/30/22)  - Follow up with Dr. Garcia in 3 months   - Discharge plans/instructions discussed with patient who verbalized understanding and agreement of plans of care.            Past Medical History:   Diagnosis Date    Compression fracture of body of thoracic vertebra     COPD (chronic obstructive pulmonary disease)        Review of patient's allergies indicates:   Allergen Reactions    Opioids - morphine analogues Anaphylaxis     Other reaction(s): Low blood pressure, SHOCK, UNSPECIFIED    Penicillins Hives     Other reaction(s): nausa    Aspirin     Brimonidine Itching    Nsaids (non-steroidal anti-inflammatory drug)      Other reaction(s): Other (See Comments)    Tramadol Hallucinations         Current Outpatient Medications:     acetaminophen (TYLENOL) 500 MG tablet, Take 500 mg by mouth every 6 (six) hours as needed for Pain., Disp: , Rfl:     apixaban (ELIQUIS) 5 mg Tab, Take 5 mg by mouth., Disp: , Rfl:     atorvastatin (LIPITOR) 80 MG tablet, Take 1 tablet (80 mg total) by mouth every evening., Disp: 90 tablet, Rfl: 3    calcium carbonate/vitamin D3 (CALCIUM 500 + D, D3, ORAL), Take by mouth Daily., Disp: , Rfl:     cyanocobalamin, vitamin B-12, 5,000 mcg Subl, Place 1,200 mcg under the tongue Daily., Disp: , Rfl:     dorzolamide (TRUSOPT) 2 % ophthalmic solution, Place 1 drop into both eyes Daily., Disp: , Rfl:     ferrous gluconate (FERGON) 324 MG tablet, Take 324 mg by mouth daily with breakfast., Disp: , Rfl:     finasteride (PROSCAR) 5 mg tablet, Take 5 mg by mouth once daily., Disp: , Rfl:     fluticasone-umeclidin-vilanter (TRELEGY ELLIPTA) 100-62.5-25 mcg DsDv, Inhale 1 puff into the lungs once daily., Disp: , Rfl:     furosemide (LASIX) 40 MG tablet, Take 1 tablet (40 mg total) by mouth 3 (three) times a week., Disp: 36 tablet, Rfl: 3    multivitamin (THERAGRAN)  per tablet, Take by mouth once daily., Disp: , Rfl:     tamsulosin (FLOMAX) 0.4 mg Cap, Take 0.4 mg by mouth once daily., Disp: , Rfl:     vit C/E/Zn/coppr/lutein/zeaxan (PRESERVISION AREDS-2 ORAL), Take by mouth Daily., Disp: , Rfl:     bimatoprost (LUMIGAN) 0.01 % Drop, Place 1 drop into both eyes every evening., Disp: , Rfl:      Objective:   ROS      Vitals:    02/01/23 1400   BP: (!) 115/57   Pulse: 76     Wt Readings from Last 3 Encounters:   02/01/23 64.1 kg (141 lb 5 oz)   11/15/22 63.5 kg (139 lb 15.9 oz)   06/17/22 58.3 kg (128 lb 8.5 oz)     Temp Readings from Last 3 Encounters:   03/17/22 98.5 °F (36.9 °C) (Oral)     BP Readings from Last 3 Encounters:   02/01/23 (!) 115/57   11/15/22 132/64   06/17/22 128/62     Pulse Readings from Last 3 Encounters:   02/01/23 76   11/15/22 73   06/17/22 68             Physical Exam  Vitals reviewed.   Constitutional:       General: He is not in acute distress.     Appearance: He is well-developed.   HENT:      Head: Normocephalic and atraumatic.      Nose: Nose normal.   Eyes:      Conjunctiva/sclera: Conjunctivae normal.      Pupils: Pupils are equal, round, and reactive to light.   Neck:      Vascular: No hepatojugular reflux or JVD.   Cardiovascular:      Rate and Rhythm: Normal rate and regular rhythm.      Pulses: Intact distal pulses.      Heart sounds: Normal heart sounds. No murmur heard.    No friction rub. No gallop.   Pulmonary:      Effort: Pulmonary effort is normal. No respiratory distress.      Breath sounds: Normal breath sounds. No wheezing or rales.   Chest:      Chest wall: No tenderness.   Abdominal:      General: Bowel sounds are normal. There is no distension.      Palpations: Abdomen is soft.      Tenderness: There is no abdominal tenderness.   Musculoskeletal:         General: No tenderness or deformity. Normal range of motion.      Cervical back: Normal range of motion and neck supple.      Right lower leg: Edema (1+) present.      Left lower  leg: Edema (1+) present.   Skin:     General: Skin is warm and dry.      Findings: No erythema or rash.   Neurological:      Mental Status: He is alert and oriented to person, place, and time.      Cranial Nerves: No cranial nerve deficit.      Motor: No abnormal muscle tone.      Coordination: Coordination normal.   Psychiatric:         Behavior: Behavior normal.         Thought Content: Thought content normal.         Judgment: Judgment normal.         No results found for: CHOL  No results found for: HDL  No results found for: LDLCALC  No results found for: ALT, AST  Lab Results   Component Value Date    CREATININE 1.0 03/16/2022    BUN 36 (H) 03/16/2022     03/16/2022    K 4.3 03/16/2022    CO2 27 03/16/2022    CO2 25 03/14/2022    CO2 30 (H) 02/09/2010     Lab Results   Component Value Date    HGB 12.8 (L) 03/14/2022    HCT 39.1 (L) 03/14/2022    HCT 38.0 (L) 02/09/2010                         Assessment and Plan:     Pulmonary embolism, unspecified chronicity, unspecified pulmonary embolism type, unspecified whether acute cor pulmonale present  Comments:  Continue Eliquis 5 mg twice a day    Chronic diastolic congestive heart failure  Comments:  Mild lower extremity edema, but I think overall volume status appears to be adequately controlled    Mixed hyperlipidemia  Comments:  On high-dose statin therapy    Status post coronary artery stent placement    Status post placement of leadless cardiac pacemaker  Comments:  Function normal    Symptomatic bradycardia       Patient appears to be doing well after large PE, he will require chronic anticoagulation.  I would not combined this with anti-platelet therapy, discontinue clopidogrel, aspirin allergy.    Patient can return to clinic as needed.  He is followed very closely by Dr. Goodman  No follow-ups on file.          Future Appointments   Date Time Provider Department Center   3/11/2023 10:00 AM HOME MONITOR DEVICE CHECK, JOAN Hernández    5/19/2023  2:20 PM COORDINATED DEVICE CHECK [facility] LOBO Zhang [facility]   5/19/2023  3:00 PM EKG, APPT [facility] EKG Vicente [facility]   5/19/2023  3:20 PM HUMERA Garcia MD [facility] ARRHYTH [facility]

## 2023-05-08 PROBLEM — I26.99 PULMONARY EMBOLISM: Status: RESOLVED | Noted: 2022-01-01 | Resolved: 2023-01-01

## 2023-08-24 NOTE — PROGRESS NOTES
Electrophysiology Clinic Note    Reason for follow-up patient visit: Ongoing evaluation and recommendations regarding symptomatic bradycardia with complete heart block, s/p successful implantation of a Micra AV leadless pacemaker on 3/16/2022.     PRESENTING HISTORY:     History of Present Illness:  Mr. Jesus Matos Sr. is a rodo 89-year-old gentleman who returns to clinic today for ongoing evaluation and recommendations regarding symptomatic bradycardia with complete heart block, s/p successful implantation of a Micra AV leadless pacemaker on 3/16/2022. He has a past medical history significant for symptomatic bradycardia with complete heart block, Micra AV leadless pacemaker, coronary artery disease, COPD, kyphosis, a prior fall with a fracture of his right femur, s/p open reduction with internal fixation with Dr. Mayo on 7/19/2022, and a prior admission with submassive pulmonary embolism on 12/9/2022, maintained on apixaban therapy.     He follows in general cardiology with Dr. Soliz and receives most of his care from [facility]. In review of his previous EP history, he was seen by Dr. Soliz on 2/28/2022 with complaints of slightly worsened shortness of breath, generalized fatigue, and transient episodes of lightheadedness. He was noted on an ECG at that time to have periods of complete heart block and was referred for pacemaker implantation. Given his tortuous subclavian vasculature, a Micra AV leadless pacemaker was successfully placed 3/16/2022. He had been doing well until he suffered a mechanical fall with a fracture of his right femur, s/p open reduction with internal fixation with Dr. Mayo on 7/19/2022. He then subsequently presented to [facility] with an admission with  a submassive pulmonary embolism on 12/9/2022, maintained on apixaban therapy. He continues to follow with Dr. Goodman and Dr. Soliz, and denies any adverse bleeding events while maintained on his apixaban  "for ongoing treatment of his PE.    Mr. Matos presents today with his daughter. In discussion with Mr. Matos and his daughter today, he tells me that he is feeling overall well, and feels "much better". His primary complaint is that following his prior fall with a hip fracture, he has been told not to ambulate without assistance. He continues to work with PT for improved mobility. He is no longer reporting periods of lightheadedness. He denies any episodes of syncope/presyncope, chest pain or chest discomfort, palpitations, nausea or vomiting, orthopnea, or PND. He reports that he previously experienced mild lower extremity edema, but remains stable on his current diuretic dose with no further episodes of edema. He reports baseline mild shortness of breath and dyspnea with exertion, and feels that this is slightly worse after he has suffered his PE. He is now ambulating mostly with assistance of a wheelchair. He cannot climb a flight of stairs secondary to osteoarthritis and kyphosis and his prior right hip fracture.     Review of Systems:  Review of Systems   Constitutional:  Negative for activity change.   HENT:  Positive for hearing loss. Negative for nasal congestion, nosebleeds, postnasal drip, rhinorrhea, sinus pressure/congestion, sneezing and sore throat.    Respiratory:  Positive for shortness of breath. Negative for apnea, cough, chest tightness and wheezing.    Cardiovascular:  Negative for chest pain, palpitations and leg swelling.   Gastrointestinal:  Negative for abdominal distention, abdominal pain, blood in stool, change in bowel habit, constipation, diarrhea, nausea, vomiting and change in bowel habit.   Genitourinary:  Negative for dysuria and hematuria.   Musculoskeletal:  Positive for arthralgias, back pain and gait problem.   Neurological:  Negative for dizziness, seizures, syncope, weakness, light-headedness, headaches, coordination difficulties and coordination difficulties.        PAST " HISTORY:     Past Medical History:  - Symptomatic bradycardia   - Complete heart block, s/p Micra AV leadless pacemaker implantation  - Coronary artery disease  - COPD  - Kyphosis  - Mechanical fall with a fracture of the right femur, s/p ORIF 7/19/2022  - Submassive pulmonary embolism on 12/9/2022, maintained on apixaban therapy    Family History:  He denies any relevant cardiac history.    Social History:  He  reports that he has quit smoking. He has never used smokeless tobacco. He reports current alcohol use. He reports that he does not use drugs.       MEDICATIONS & ALLERGIES:     Review of patient's allergies indicates:   Allergen Reactions    Opioids - morphine analogues Anaphylaxis     Other reaction(s): Low blood pressure, SHOCK, UNSPECIFIED    Penicillins Hives     Other reaction(s): nausa    Aspirin     Brimonidine Itching    Nsaids (non-steroidal anti-inflammatory drug)      Other reaction(s): Other (See Comments)    Tramadol Hallucinations       Current Outpatient Medications on File Prior to Visit   Medication Sig Dispense Refill    acetaminophen (TYLENOL) 500 MG tablet Take 500 mg by mouth every 6 (six) hours as needed for Pain.      atorvastatin (LIPITOR) 80 MG tablet TAKE ONE TABLET BY MOUTH EVERY EVENING 90 tablet 1    bimatoprost (LUMIGAN) 0.01 % Drop Place 1 drop into both eyes every evening.      calcium carbonate/vitamin D3 (CALCIUM 500 + D, D3, ORAL) Take by mouth Daily.      cyanocobalamin, vitamin B-12, 5,000 mcg Subl Place 1,200 mcg under the tongue Daily.      dorzolamide (TRUSOPT) 2 % ophthalmic solution Place 1 drop into both eyes Daily.      esomeprazole (NEXIUM) 20 MG capsule Take 20 mg by mouth every morning.      ferrous gluconate (FERGON) 324 MG tablet Take 324 mg by mouth daily with breakfast.      finasteride (PROSCAR) 5 mg tablet Take 5 mg by mouth once daily.      fluticasone-umeclidin-vilanter (TRELEGY ELLIPTA) 100-62.5-25 mcg DsDv Inhale 1 puff into the lungs once daily.       furosemide (LASIX) 20 MG tablet Patient and family can adjust furosemide between 1 tablet 3 times a week to 1 tablet daily depending upon extent of swelling 30 tablet 11    multivitamin (THERAGRAN) per tablet Take by mouth once daily.      tamsulosin (FLOMAX) 0.4 mg Cap Take 0.4 mg by mouth once daily.      vit C/E/Zn/coppr/lutein/zeaxan (PRESERVISION AREDS-2 ORAL) Take by mouth Daily.       No current facility-administered medications on file prior to visit.        OBJECTIVE:     Vital Signs:  /57  Pulse 76  Wt 58.3 kg     Physical Exam:  Physical Exam  Constitutional:       General: He is not in acute distress.     Appearance: Normal appearance. He is not ill-appearing or diaphoretic.      Comments: Well-appearing elderly man in NAD presenting in a wheelchair.   HENT:      Head: Normocephalic and atraumatic.      Nose: Nose normal.      Mouth/Throat:      Mouth: Mucous membranes are moist.      Pharynx: Oropharynx is clear.   Eyes:      Pupils: Pupils are equal, round, and reactive to light.   Cardiovascular:      Rate and Rhythm: Normal rate and regular rhythm.      Pulses: Normal pulses.      Heart sounds: Normal heart sounds. No murmur heard.     No friction rub. No gallop.   Pulmonary:      Effort: Pulmonary effort is normal. No respiratory distress.      Breath sounds: Normal breath sounds. No wheezing, rhonchi or rales.   Chest:      Chest wall: No tenderness.   Abdominal:      General: There is no distension.      Palpations: Abdomen is soft.      Tenderness: There is no abdominal tenderness.   Musculoskeletal:         General: No swelling or tenderness.      Cervical back: Normal range of motion.      Right lower leg: No edema.      Left lower leg: No edema.   Skin:     General: Skin is warm and dry.      Findings: No erythema, lesion or rash.   Neurological:      General: No focal deficit present.      Mental Status: He is alert and oriented to person, place, and time. Mental status is at  baseline.      Motor: No weakness.      Gait: Gait normal.   Psychiatric:         Mood and Affect: Mood normal.         Behavior: Behavior normal.        Laboratory Data:  Lab Results   Component Value Date    WBC 8.9 03/14/2022    HGB 12.8 (L) 03/14/2022    HCT 39.1 (L) 03/14/2022    MCV 92.3 03/14/2022     03/14/2022     Lab Results   Component Value Date     03/16/2022     03/16/2022    K 4.3 03/16/2022     03/16/2022    CO2 27 03/16/2022    BUN 36 (H) 03/16/2022    CREATININE 1.0 03/16/2022    CALCIUM 9.2 03/16/2022     Lab Results   Component Value Date    INR 1.1 03/14/2022    INR 1.0 02/09/2010       Pertinent Cardiac Data:  ECG: Atrially-sensed, ventricularly-paced rhythm with rate of 68 bpm, NH ~160 ms, paced QRSd 158 ms, QT/QTc 444/472 ms.    Resting 2D Transthoracic Echocardiogram - 2/28/2022:  The left ventricle is normal in size with low normal systolic function.  The estimated ejection fraction is 50%.  Grade I left ventricular diastolic dysfunction.  Moderate left atrial enlargement.  The estimated PA systolic pressure is 75 mmHg.  Normal right ventricular size with normal right ventricular systolic function.  There is moderate pulmonary hypertension.  Normal central venous pressure (3 mmHg).  Moderate right atrial enlargement.  Mild mitral regurgitation.  Mild tricuspid regurgitation.  Mild aortic regurgitation.    Device Interrogation: Personal review of his device interrogation report ([employer] Micra AV leadless pacemaker, implanted 3/16/2022) reveals adequate battery longevity with an estimated >8 years of battery life remaining. He has underlying sinus rhythm with complete heart block, with an escape in the 40s. His sensing, threshold, and impedance parameters remain stable. He is AM- 73.6%, VS 2.2%, and  24.1%. There are no concerning AHR or VHR episodes noted.        ASSESSMENT & PLAN:   Mr. Jesus Matos . is a rodo 89-year-old gentleman who returns  to clinic today for ongoing evaluation and recommendations regarding symptomatic bradycardia with complete heart block, s/p successful implantation of a Micra AV leadless pacemaker on 3/16/2022. He has a past medical history significant for symptomatic bradycardia with complete heart block, Micra AV leadless pacemaker, coronary artery disease, COPD, kyphosis, a prior fall with a fracture of his right femur, s/p open reduction with internal fixation with Dr. Mayo on 7/19/2022, and a prior admission with submassive pulmonary embolism on 12/9/2022, maintained on apixaban therapy.     - He has a normally functioning Micra AV leadless pacemaker on device interrogation today. He will continue to send remote transmissions, with repeat in-office interrogation in six months.   - He is predominately paced from the RV, with a total RV pacing burden of 97.7%. His paced QRSd is 158ms, with his intrinsic RBBB QRSd of 148ms. He continues to have preserved systolic function on echocardiogram, LVEF 50%.      This patient will return to clinic with me in six months with continued remote transmissions, PCP, and general cardiology appointments in the interim. All questions and concerns were addressed at this encounter.     Signing Physician:       JORGE L Garcia MD  Electrophysiology Attending

## 2023-08-25 PROBLEM — Z86.711 HISTORY OF PULMONARY EMBOLISM: Status: ACTIVE | Noted: 2023-01-01

## 2023-08-25 PROBLEM — Z79.01 CHRONIC ANTICOAGULATION: Status: ACTIVE | Noted: 2023-01-01

## 2023-09-11 ENCOUNTER — PATIENT MESSAGE (OUTPATIENT)
Dept: CARDIOLOGY | Facility: CLINIC | Age: 88
End: 2023-09-11
Payer: MEDICARE

## 2023-09-11 ENCOUNTER — PATIENT MESSAGE (OUTPATIENT)
Dept: ELECTROPHYSIOLOGY | Facility: CLINIC | Age: 88
End: 2023-09-11
Payer: MEDICARE

## 2023-09-12 ENCOUNTER — TELEPHONE (OUTPATIENT)
Dept: ELECTROPHYSIOLOGY | Facility: CLINIC | Age: 88
End: 2023-09-12
Payer: MEDICARE

## 2023-09-12 NOTE — TELEPHONE ENCOUNTER
"Called pt's daughter Sofiya on this am and offered my condolences for the loss of her Father.  Also informed her that I did order a return kit from [employer] for pt's remote PPM home monitor.  Address updated in [facility] to reflect the daughter's address as to she will be bringing pt's monitor to her home.  Understanding was verbalized.  The daughter expressed appreciation to Dr. Garcia, [facility] staff for the "wonderful care the pt received".  Thanked Sofiya.   "

## (undated) DEVICE — KIT PROBE COVER WITH GEL

## (undated) DEVICE — ELECTRODE REM PLYHSV RETURN 9

## (undated) DEVICE — GUIDEWIRE EMERALD 150CM PTFE

## (undated) DEVICE — PACK EP DRAPE

## (undated) DEVICE — Device

## (undated) DEVICE — GUIDE WIRE AMPLATZ .035X1 MDTH

## (undated) DEVICE — SEE MEDLINE ITEM 107746

## (undated) DEVICE — DILATOR VES COONS .038X16X20CM

## (undated) DEVICE — SHEATH BRITE TIP 7FR 35CM

## (undated) DEVICE — PAD DEFIB CADENCE ADULT R2

## (undated) DEVICE — CATH 5FR MULTI MPA2

## (undated) DEVICE — SHEATH HEMOSTASIS 8.5FR

## (undated) DEVICE — GUIDEWIRE STD .035X180CM ANG

## (undated) DEVICE — DILATOR VESSEL COONS 18FR 20CM

## (undated) DEVICE — DILATOR COONS TAPER 14FR

## (undated) DEVICE — INTRO 8.5FR 63CM SRO

## (undated) DEVICE — INTRODUCER HEMOSTASIS 6.5FR

## (undated) DEVICE — CATH RESPONSE QPLR JSN 6F 120

## (undated) DEVICE — STOPCOCK 3-WAY

## (undated) DEVICE — DIALATOR COONS TAPER 12F 20CM